# Patient Record
Sex: MALE | Race: WHITE | NOT HISPANIC OR LATINO | Employment: OTHER | URBAN - METROPOLITAN AREA
[De-identification: names, ages, dates, MRNs, and addresses within clinical notes are randomized per-mention and may not be internally consistent; named-entity substitution may affect disease eponyms.]

---

## 2023-08-07 LAB
ALANINE AMINOTRANSFERASE (SGPT) (U/L) IN SER/PLAS: 15 U/L (ref 10–52)
ALBUMIN (G/DL) IN SER/PLAS: 4.5 G/DL (ref 3.4–5)
ALKALINE PHOSPHATASE (U/L) IN SER/PLAS: 72 U/L (ref 33–136)
ANION GAP IN SER/PLAS: 12 MMOL/L (ref 10–20)
ASPARTATE AMINOTRANSFERASE (SGOT) (U/L) IN SER/PLAS: 20 U/L (ref 9–39)
BILIRUBIN TOTAL (MG/DL) IN SER/PLAS: 0.7 MG/DL (ref 0–1.2)
CALCIUM (MG/DL) IN SER/PLAS: 9.8 MG/DL (ref 8.6–10.6)
CARBON DIOXIDE, TOTAL (MMOL/L) IN SER/PLAS: 30 MMOL/L (ref 21–32)
CHLORIDE (MMOL/L) IN SER/PLAS: 104 MMOL/L (ref 98–107)
CHOLESTEROL (MG/DL) IN SER/PLAS: 149 MG/DL (ref 0–199)
CHOLESTEROL IN HDL (MG/DL) IN SER/PLAS: 33.8 MG/DL
CHOLESTEROL/HDL RATIO: 4.4
CREATININE (MG/DL) IN SER/PLAS: 0.88 MG/DL (ref 0.5–1.3)
ERYTHROCYTE DISTRIBUTION WIDTH (RATIO) BY AUTOMATED COUNT: 15 % (ref 11.5–14.5)
ERYTHROCYTE MEAN CORPUSCULAR HEMOGLOBIN CONCENTRATION (G/DL) BY AUTOMATED: 31.3 G/DL (ref 32–36)
ERYTHROCYTE MEAN CORPUSCULAR VOLUME (FL) BY AUTOMATED COUNT: 94 FL (ref 80–100)
ERYTHROCYTES (10*6/UL) IN BLOOD BY AUTOMATED COUNT: 4.64 X10E12/L (ref 4.5–5.9)
GFR MALE: >90 ML/MIN/1.73M2
GLUCOSE (MG/DL) IN SER/PLAS: 101 MG/DL (ref 74–99)
HEMATOCRIT (%) IN BLOOD BY AUTOMATED COUNT: 43.8 % (ref 41–52)
HEMOGLOBIN (G/DL) IN BLOOD: 13.7 G/DL (ref 13.5–17.5)
LDL: 91 MG/DL (ref 0–99)
LEUKOCYTES (10*3/UL) IN BLOOD BY AUTOMATED COUNT: 7 X10E9/L (ref 4.4–11.3)
NRBC (PER 100 WBCS) BY AUTOMATED COUNT: 0 /100 WBC (ref 0–0)
PLATELETS (10*3/UL) IN BLOOD AUTOMATED COUNT: 223 X10E9/L (ref 150–450)
POTASSIUM (MMOL/L) IN SER/PLAS: 5.3 MMOL/L (ref 3.5–5.3)
PROTEIN TOTAL: 7.8 G/DL (ref 6.4–8.2)
SODIUM (MMOL/L) IN SER/PLAS: 141 MMOL/L (ref 136–145)
TRIGLYCERIDE (MG/DL) IN SER/PLAS: 123 MG/DL (ref 0–149)
UREA NITROGEN (MG/DL) IN SER/PLAS: 17 MG/DL (ref 6–23)
VLDL: 25 MG/DL (ref 0–40)

## 2024-01-10 DIAGNOSIS — I48.92 UNSPECIFIED ATRIAL FLUTTER (MULTI): Primary | ICD-10-CM

## 2024-01-10 PROBLEM — M54.16 LEFT LUMBAR RADICULITIS: Status: ACTIVE | Noted: 2024-01-10

## 2024-01-10 PROBLEM — R91.1 SOLITARY PULMONARY NODULE: Status: ACTIVE | Noted: 2024-01-10

## 2024-01-10 PROBLEM — I10 HYPERTENSION: Status: ACTIVE | Noted: 2024-01-10

## 2024-01-10 PROBLEM — E78.5 HYPERLIPIDEMIA: Status: ACTIVE | Noted: 2024-01-10

## 2024-01-10 PROBLEM — M54.16 LUMBAR RADICULOPATHY: Status: ACTIVE | Noted: 2024-01-10

## 2024-01-10 PROBLEM — I83.899 VARICOSE VEINS OF LEG WITH SWELLING: Status: ACTIVE | Noted: 2024-01-10

## 2024-01-10 PROBLEM — M48.07 FORAMINAL STENOSIS OF LUMBOSACRAL REGION: Status: ACTIVE | Noted: 2024-01-10

## 2024-01-10 PROBLEM — M54.42 CHRONIC LEFT-SIDED LOW BACK PAIN WITH LEFT-SIDED SCIATICA: Status: ACTIVE | Noted: 2024-01-10

## 2024-01-10 PROBLEM — G89.29 CHRONIC LEFT-SIDED LOW BACK PAIN WITH LEFT-SIDED SCIATICA: Status: ACTIVE | Noted: 2024-01-10

## 2024-01-10 PROBLEM — I50.9 CONGESTIVE HEART FAILURE (MULTI): Status: ACTIVE | Noted: 2024-01-10

## 2024-01-10 PROBLEM — L60.0 INGROWN TOENAIL: Status: ACTIVE | Noted: 2024-01-10

## 2024-01-10 PROBLEM — M51.26 DISC DISPLACEMENT, LUMBAR: Status: ACTIVE | Noted: 2024-01-10

## 2024-01-10 PROBLEM — M51.36 DEGENERATION OF INTERVERTEBRAL DISC OF LUMBAR REGION: Status: ACTIVE | Noted: 2024-01-10

## 2024-01-10 PROBLEM — L30.9 ECZEMA: Status: ACTIVE | Noted: 2024-01-10

## 2024-01-10 PROBLEM — I42.0 DILATED CONGESTIVE CARDIOMYOPATHY (MULTI): Status: ACTIVE | Noted: 2024-01-10

## 2024-01-10 PROBLEM — M51.369 DEGENERATION OF INTERVERTEBRAL DISC OF LUMBAR REGION: Status: ACTIVE | Noted: 2024-01-10

## 2024-01-10 PROBLEM — M54.32 SCIATICA OF LEFT SIDE: Status: ACTIVE | Noted: 2024-01-10

## 2024-01-10 PROBLEM — I25.10 CORONARY ARTERIOSCLEROSIS: Status: ACTIVE | Noted: 2024-01-10

## 2024-01-10 PROBLEM — E74.39 OTHER DISORDERS OF INTESTINAL CARBOHYDRATE ABSORPTION: Status: ACTIVE | Noted: 2024-01-10

## 2024-01-10 RX ORDER — EZETIMIBE 10 MG/1
1 TABLET ORAL NIGHTLY
COMMUNITY
Start: 2021-04-30

## 2024-01-10 RX ORDER — ROSUVASTATIN CALCIUM 40 MG/1
40 TABLET, COATED ORAL DAILY
COMMUNITY

## 2024-01-10 RX ORDER — CARVEDILOL 25 MG/1
50 TABLET ORAL 2 TIMES DAILY
COMMUNITY

## 2024-01-10 RX ORDER — APIXABAN 5 MG/1
5 TABLET, FILM COATED ORAL 2 TIMES DAILY
Qty: 180 TABLET | Refills: 3 | Status: SHIPPED | OUTPATIENT
Start: 2024-01-10

## 2024-01-10 RX ORDER — APIXABAN 5 MG/1
5 TABLET, FILM COATED ORAL 2 TIMES DAILY
COMMUNITY
End: 2024-02-28 | Stop reason: SDUPTHER

## 2024-01-10 RX ORDER — SPIRONOLACTONE 25 MG/1
12.5 TABLET ORAL DAILY
COMMUNITY

## 2024-01-10 RX ORDER — LOSARTAN POTASSIUM 50 MG/1
50 TABLET ORAL DAILY
COMMUNITY

## 2024-02-28 ENCOUNTER — LAB (OUTPATIENT)
Dept: LAB | Facility: LAB | Age: 74
End: 2024-02-28
Payer: COMMERCIAL

## 2024-02-28 ENCOUNTER — OFFICE VISIT (OUTPATIENT)
Dept: CARDIOLOGY | Facility: HOSPITAL | Age: 74
End: 2024-02-28
Payer: COMMERCIAL

## 2024-02-28 VITALS
SYSTOLIC BLOOD PRESSURE: 126 MMHG | HEIGHT: 74 IN | WEIGHT: 315 LBS | DIASTOLIC BLOOD PRESSURE: 74 MMHG | BODY MASS INDEX: 40.43 KG/M2 | HEART RATE: 46 BPM

## 2024-02-28 DIAGNOSIS — Z12.5 SCREENING FOR PROSTATE CANCER: ICD-10-CM

## 2024-02-28 DIAGNOSIS — Z87.891 HISTORY OF TOBACCO USE: ICD-10-CM

## 2024-02-28 DIAGNOSIS — I48.3 TYPICAL ATRIAL FLUTTER (MULTI): ICD-10-CM

## 2024-02-28 DIAGNOSIS — I10 PRIMARY HYPERTENSION: ICD-10-CM

## 2024-02-28 DIAGNOSIS — I42.0 DILATED CONGESTIVE CARDIOMYOPATHY (MULTI): ICD-10-CM

## 2024-02-28 DIAGNOSIS — E74.39 GLUCOSE INTOLERANCE: ICD-10-CM

## 2024-02-28 DIAGNOSIS — E78.5 HYPERLIPIDEMIA, UNSPECIFIED HYPERLIPIDEMIA TYPE: ICD-10-CM

## 2024-02-28 DIAGNOSIS — I42.0 DILATED CONGESTIVE CARDIOMYOPATHY (MULTI): Primary | ICD-10-CM

## 2024-02-28 DIAGNOSIS — I25.10 CORONARY ARTERIOSCLEROSIS: ICD-10-CM

## 2024-02-28 DIAGNOSIS — R79.9 ABNORMAL FINDING OF BLOOD CHEMISTRY, UNSPECIFIED: ICD-10-CM

## 2024-02-28 LAB
ALBUMIN SERPL BCP-MCNC: 4.4 G/DL (ref 3.4–5)
ALP SERPL-CCNC: 66 U/L (ref 33–136)
ALT SERPL W P-5'-P-CCNC: 18 U/L (ref 10–52)
ANION GAP SERPL CALC-SCNC: 13 MMOL/L (ref 10–20)
AST SERPL W P-5'-P-CCNC: 23 U/L (ref 9–39)
BILIRUB SERPL-MCNC: 0.5 MG/DL (ref 0–1.2)
BUN SERPL-MCNC: 24 MG/DL (ref 6–23)
CALCIUM SERPL-MCNC: 9.1 MG/DL (ref 8.6–10.3)
CHLORIDE SERPL-SCNC: 103 MMOL/L (ref 98–107)
CHOLEST SERPL-MCNC: 155 MG/DL (ref 0–199)
CHOLESTEROL/HDL RATIO: 4
CO2 SERPL-SCNC: 27 MMOL/L (ref 21–32)
CREAT SERPL-MCNC: 0.92 MG/DL (ref 0.5–1.3)
EGFRCR SERPLBLD CKD-EPI 2021: 88 ML/MIN/1.73M*2
ERYTHROCYTE [DISTWIDTH] IN BLOOD BY AUTOMATED COUNT: 14.7 % (ref 11.5–14.5)
EST. AVERAGE GLUCOSE BLD GHB EST-MCNC: 128 MG/DL
GLUCOSE SERPL-MCNC: 101 MG/DL (ref 74–99)
HBA1C MFR BLD: 6.1 %
HCT VFR BLD AUTO: 42.8 % (ref 41–52)
HDLC SERPL-MCNC: 38.5 MG/DL
HGB BLD-MCNC: 13.7 G/DL (ref 13.5–17.5)
LDLC SERPL CALC-MCNC: 94 MG/DL
MCH RBC QN AUTO: 30 PG (ref 26–34)
MCHC RBC AUTO-ENTMCNC: 32 G/DL (ref 32–36)
MCV RBC AUTO: 94 FL (ref 80–100)
NON HDL CHOLESTEROL: 117 MG/DL (ref 0–149)
NRBC BLD-RTO: 0 /100 WBCS (ref 0–0)
PLATELET # BLD AUTO: 196 X10*3/UL (ref 150–450)
POTASSIUM SERPL-SCNC: 4.8 MMOL/L (ref 3.5–5.3)
PROT SERPL-MCNC: 7.8 G/DL (ref 6.4–8.2)
PSA SERPL-MCNC: 1.82 NG/ML
RBC # BLD AUTO: 4.57 X10*6/UL (ref 4.5–5.9)
SODIUM SERPL-SCNC: 138 MMOL/L (ref 136–145)
TRIGL SERPL-MCNC: 112 MG/DL (ref 0–149)
VLDL: 22 MG/DL (ref 0–40)
WBC # BLD AUTO: 6.2 X10*3/UL (ref 4.4–11.3)

## 2024-02-28 PROCEDURE — G0103 PSA SCREENING: HCPCS

## 2024-02-28 PROCEDURE — 1036F TOBACCO NON-USER: CPT | Performed by: INTERNAL MEDICINE

## 2024-02-28 PROCEDURE — 80061 LIPID PANEL: CPT

## 2024-02-28 PROCEDURE — 93005 ELECTROCARDIOGRAM TRACING: CPT | Performed by: INTERNAL MEDICINE

## 2024-02-28 PROCEDURE — 99214 OFFICE O/P EST MOD 30 MIN: CPT | Performed by: INTERNAL MEDICINE

## 2024-02-28 PROCEDURE — 36415 COLL VENOUS BLD VENIPUNCTURE: CPT

## 2024-02-28 PROCEDURE — 3074F SYST BP LT 130 MM HG: CPT | Performed by: INTERNAL MEDICINE

## 2024-02-28 PROCEDURE — 83036 HEMOGLOBIN GLYCOSYLATED A1C: CPT

## 2024-02-28 PROCEDURE — 85027 COMPLETE CBC AUTOMATED: CPT

## 2024-02-28 PROCEDURE — 93010 ELECTROCARDIOGRAM REPORT: CPT | Performed by: INTERNAL MEDICINE

## 2024-02-28 PROCEDURE — 80053 COMPREHEN METABOLIC PANEL: CPT

## 2024-02-28 PROCEDURE — 1159F MED LIST DOCD IN RCRD: CPT | Performed by: INTERNAL MEDICINE

## 2024-02-28 PROCEDURE — 3078F DIAST BP <80 MM HG: CPT | Performed by: INTERNAL MEDICINE

## 2024-02-28 PROCEDURE — 1125F AMNT PAIN NOTED PAIN PRSNT: CPT | Performed by: INTERNAL MEDICINE

## 2024-02-28 PROCEDURE — 1160F RVW MEDS BY RX/DR IN RCRD: CPT | Performed by: INTERNAL MEDICINE

## 2024-02-28 ASSESSMENT — ENCOUNTER SYMPTOMS
DEPRESSION: 0
OCCASIONAL FEELINGS OF UNSTEADINESS: 0
LOSS OF SENSATION IN FEET: 0

## 2024-02-28 NOTE — PROGRESS NOTES
Primary Care Physician: Westley Cross MD  Date of Visit: 02/28/2024 11:20 AM EST  Location of visit: [facility]     Chief Complaint:   Chief Complaint   Patient presents with    Follow-up        HPI / Summary:   Jese Christy is a 73 y.o. male presents for followup.  He has no particular complaints.  Most limited by his chronic back pain.  He is able to walk up and down stairs without chest pain or shortness of breath.  The patient denies chest pain, shortness of breath, palpitations, lightheadedness, syncope, orthopnea, paroxysmal nocturnal dyspnea, lower extremity edema, or bleeding problems.          Past Medical History:   Diagnosis Date    Dilated cardiomyopathy (CMS/HCC) 05/11/2022    Dilated congestive cardiomyopathy    Personal history of other diseases of the circulatory system     History of atrial fibrillation    Personal history of other diseases of the circulatory system     History of congestive heart disease    Personal history of other diseases of the circulatory system     History of hypertension    Personal history of other endocrine, nutritional and metabolic disease     History of obesity    Personal history of other endocrine, nutritional and metabolic disease     History of hyperlipidemia    Solitary pulmonary nodule 11/06/2013    Solitary pulmonary nodule    Unspecified atrial flutter (CMS/HCC)     Atrial flutter        Past Surgical History:   Procedure Laterality Date    AV NODE ABLATION  03/04/2014    Catheter Ablation Atrial Fibrillation    BACK SURGERY  12/12/2013    Back Surgery          Social History:   reports that he has quit smoking. His smoking use included cigarettes. He has a 20.00 pack-year smoking history. He has never used smokeless tobacco. He reports that he does not currently use alcohol. He reports that he does not currently use drugs.     Family History:  family history is not on file.      Allergies:  Allergies   Allergen Reactions    Lisinopril Cough     "Penicillins Hives and Itching       Outpatient Medications:  Current Outpatient Medications   Medication Instructions    carvedilol (COREG) 50 mg, oral, 2 times daily    Eliquis 5 mg, oral, 2 times daily    ezetimibe (Zetia) 10 mg tablet 1 tablet, oral, Nightly    losartan (COZAAR) 50 mg, oral, Daily    rosuvastatin (CRESTOR) 40 mg, oral, Daily    spironolactone (ALDACTONE) 12.5 mg, oral, Daily       Physical Exam:  Vitals:    02/28/24 1110   BP: 126/74   BP Location: Left arm   Patient Position: Sitting   BP Cuff Size: Adult   Pulse: (!) 46   Weight: 147 kg (323 lb 14.4 oz)   Height: 1.88 m (6' 2\")     Wt Readings from Last 5 Encounters:   02/28/24 147 kg (323 lb 14.4 oz)   08/03/23 149 kg (329 lb)   05/11/22 (!) 152 kg (335 lb 0.8 oz)   01/27/22 150 kg (330 lb 11 oz)   09/09/21 (!) 147 kg (325 lb)     Body mass index is 41.59 kg/m².   General: Well-developed well-nourished in no acute distress  HEENT: Normocephalic atraumatic  Neck: Supple, JVP is normal negative hepatojugular reflux 2+ carotid pulses without bruit  Pulmonary: Normal respiratory effort, clear to auscultation  Cardiovascular: No right ventricular heave, normal S1 and S2, 2 out of 6 early peaking systolic ejection murmur no rubs or gallops  Abdomen: Soft nontender nondistended  Extremities: Warm without edema 2+ radial pulses bilaterally 2+ posterior tibial pulses bilaterally  Neurologic: Alert and oriented x3  Psychiatric: Normal mood and affect     Last Labs:  CMP:  Recent Labs     08/07/23  1240 05/11/22  0935 01/05/22  1040    138 141   K 5.3 4.6 4.6    102 104   CO2 30 29 31   ANIONGAP 12 12 11   BUN 17 23 19   CREATININE 0.88 0.75 1.03   GLUCOSE 101* 108* 102*     Recent Labs     08/07/23  1240 05/11/22  0935 12/11/19  1122   ALBUMIN 4.5 4.0 4.5   ALKPHOS 72 71 70   ALT 15 15 15   AST 20 17 18   BILITOT 0.7 0.5 0.7     CBC:  Recent Labs     08/07/23  1240 05/11/22  0935 01/05/22  1040   WBC 7.0 6.9 6.9   HGB 13.7 13.4* 13.4*   HCT " "43.8 43.2 42.2    193 210   MCV 94 95 94     COAG:   Recent Labs     01/05/22  1040   INR 1.4*     HEME/ENDO:No results for input(s): \"FERRITIN\", \"IRONSAT\", \"TSH\", \"HGBA1C\" in the last 65450 hours.   CARDIAC: No results for input(s): \"LDH\", \"CKMB\", \"TROPHS\", \"BNP\" in the last 45338 hours.    No lab exists for component: \"CK\", \"CKMBP\"  Recent Labs     08/07/23  1240 05/11/22  0935 02/22/21  1128   CHOL 149 115 160   LDLF 91 62 104*   HDL 33.8* 37.4* 35.9*   TRIG 123 79 103       Last Cardiology Tests:  ECG:  Electrocardiogram performed today that I reviewed shows sinus bradycardia and is otherwise unremarkable.    Echo:  Echocardiogram May 11, 2022  CONCLUSIONS:   1. The left ventricular systolic function is normal.   2. Spectral Doppler shows an impaired relaxation pattern of left ventricular diastolic filling.   3. The left atrium is moderately dilated.   4. There is moderate aortic valve cusp calcification.       Cath:      Stress Test:  Stress Results:  No results found for this or any previous visit from the past 365 days.         Cardiac Imaging:        Assessment/Plan   Diagnoses and all orders for this visit:  Dilated congestive cardiomyopathy (CMS/HCC)  -     Comprehensive Metabolic Panel; Future  -     CBC; Future  Hyperlipidemia, unspecified hyperlipidemia type  -     ECG 12 lead (Clinic Performed)  -     Lipid Panel; Future  Primary hypertension  -     PSA; Future  Coronary arteriosclerosis  Typical atrial flutter (CMS/HCC)  Glucose intolerance  -     Hemoglobin A1C; Future  Abnormal finding of blood chemistry, unspecified  -     Hemoglobin A1C; Future  Screening for prostate cancer  -     PSA; Future  History of tobacco use  -     Vascular US abdominal aorta anuerysm AAA screening; Future    In summary, Mr. 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  He is asymptomatic from a cardiac perspective.  He is euvolemic on exam.  I encouraged him to increase his physical activity and to continue to work on losing weight.  He will follow-up with his primary physician regarding the possibility of a GLP-1 agonist.  I ordered labs as indicated below.  He should continue his current cardiovascular medications.  We will see him back in follow-up in 6 months with an abdominal aortic ultrasound to screen for an aneurysm given his prior tobacco use.      Orders:  Orders Placed This Encounter   Procedures    Lipid Panel    Comprehensive Metabolic Panel    CBC    Hemoglobin A1C    PSA    ECG 12 lead (Clinic Performed)      Followup Appts:  Future Appointments   Date Time Provider Department Center   9/4/2024 10:00 AM [facility] 1 [facility]   9/4/2024 11:00 AM PATEL Stock-CNP [facility]CR1 [facility]   2/26/2025  2:00 PM Reynold Chakraborty MD [facility]CR1 [facility]           ____________________________________________________________  Reynold Chakraborty MD  [facility] [place]  [facility]

## 2024-02-28 NOTE — PATIENT INSTRUCTIONS
Check labs today.  Increase your physical activity.  Follow-up in 6 months with an abdominal aortic ultrasound.

## 2024-03-01 LAB
ATRIAL RATE: 46 BPM
P AXIS: 35 DEGREES
P OFFSET: 196 MS
P ONSET: 142 MS
PR INTERVAL: 154 MS
Q ONSET: 219 MS
QRS COUNT: 7 BEATS
QRS DURATION: 106 MS
QT INTERVAL: 454 MS
QTC CALCULATION(BAZETT): 397 MS
QTC FREDERICIA: 415 MS
R AXIS: 53 DEGREES
T AXIS: 67 DEGREES
T OFFSET: 446 MS
VENTRICULAR RATE: 46 BPM

## 2024-03-17 NOTE — RESULT ENCOUNTER NOTE
Stable CBC. Mildly elevated A1C and glucose. F/u with PCP. Lipids not at goal <70 LDL. Add PCSK9 inhibitor. Recheck lipids in 3 months.

## 2024-04-12 ENCOUNTER — TELEPHONE (OUTPATIENT)
Dept: CARDIOLOGY | Facility: HOSPITAL | Age: 74
End: 2024-04-12
Payer: COMMERCIAL

## 2024-04-12 DIAGNOSIS — E78.5 HYPERLIPIDEMIA, UNSPECIFIED HYPERLIPIDEMIA TYPE: Primary | ICD-10-CM

## 2024-04-12 NOTE — TELEPHONE ENCOUNTER
Patient was on another call. Lab results provided. He will call us at a later time to discuss cholesterol management.

## 2024-04-26 NOTE — TELEPHONE ENCOUNTER
Per Dr. Chakraborty,   Stable CBC. Mildly elevated A1C and glucose. F/u with PCP. Lipids not at goal <70 LDL. Add PCSK9 inhibitor. Recheck lipids in 3 months.     Patient notified and agreeable to plan of care.

## 2024-04-29 RX ORDER — EVOLOCUMAB 140 MG/ML
140 INJECTION, SOLUTION SUBCUTANEOUS
Qty: 6 EACH | Refills: 4 | Status: SHIPPED | OUTPATIENT
Start: 2024-04-29

## 2024-05-03 PROCEDURE — RXMED WILLOW AMBULATORY MEDICATION CHARGE

## 2024-05-10 ENCOUNTER — PHARMACY VISIT (OUTPATIENT)
Dept: PHARMACY | Facility: CLINIC | Age: 74
End: 2024-05-10
Payer: COMMERCIAL

## 2024-07-28 DIAGNOSIS — I50.9 HEART FAILURE, UNSPECIFIED (MULTI): ICD-10-CM

## 2024-07-28 DIAGNOSIS — E78.5 HYPERLIPIDEMIA, UNSPECIFIED: ICD-10-CM

## 2024-07-28 DIAGNOSIS — I10 ESSENTIAL (PRIMARY) HYPERTENSION: ICD-10-CM

## 2024-07-29 RX ORDER — LOSARTAN POTASSIUM 50 MG/1
50 TABLET ORAL DAILY
Qty: 90 TABLET | Refills: 3 | Status: SHIPPED | OUTPATIENT
Start: 2024-07-29 | End: 2025-07-29

## 2024-07-29 RX ORDER — SPIRONOLACTONE 25 MG/1
12.5 TABLET ORAL DAILY
Qty: 45 TABLET | Refills: 3 | Status: SHIPPED | OUTPATIENT
Start: 2024-07-29 | End: 2025-07-29

## 2024-07-29 RX ORDER — CARVEDILOL 25 MG/1
50 TABLET ORAL 2 TIMES DAILY
Qty: 360 TABLET | Refills: 3 | Status: SHIPPED | OUTPATIENT
Start: 2024-07-29 | End: 2025-07-29

## 2024-07-29 RX ORDER — ROSUVASTATIN CALCIUM 40 MG/1
40 TABLET, COATED ORAL DAILY
Qty: 90 TABLET | Refills: 3 | Status: SHIPPED | OUTPATIENT
Start: 2024-07-29 | End: 2025-07-29

## 2024-08-28 ENCOUNTER — APPOINTMENT (OUTPATIENT)
Dept: CARDIOLOGY | Facility: HOSPITAL | Age: 74
End: 2024-08-28
Payer: COMMERCIAL

## 2024-09-04 ENCOUNTER — OFFICE VISIT (OUTPATIENT)
Dept: CARDIOLOGY | Facility: HOSPITAL | Age: 74
End: 2024-09-04
Payer: COMMERCIAL

## 2024-09-04 ENCOUNTER — HOSPITAL ENCOUNTER (OUTPATIENT)
Dept: VASCULAR MEDICINE | Facility: HOSPITAL | Age: 74
Discharge: HOME | End: 2024-09-04
Payer: COMMERCIAL

## 2024-09-04 VITALS
HEIGHT: 74 IN | HEART RATE: 55 BPM | SYSTOLIC BLOOD PRESSURE: 127 MMHG | WEIGHT: 315 LBS | OXYGEN SATURATION: 97 % | DIASTOLIC BLOOD PRESSURE: 74 MMHG | BODY MASS INDEX: 40.43 KG/M2

## 2024-09-04 DIAGNOSIS — I10 PRIMARY HYPERTENSION: ICD-10-CM

## 2024-09-04 DIAGNOSIS — Z13.6 ENCOUNTER FOR SCREENING FOR CARDIOVASCULAR DISORDERS: ICD-10-CM

## 2024-09-04 DIAGNOSIS — I25.10 CORONARY ARTERIOSCLEROSIS: ICD-10-CM

## 2024-09-04 DIAGNOSIS — I48.3 TYPICAL ATRIAL FLUTTER (MULTI): ICD-10-CM

## 2024-09-04 DIAGNOSIS — E78.5 HYPERLIPIDEMIA, UNSPECIFIED HYPERLIPIDEMIA TYPE: Primary | ICD-10-CM

## 2024-09-04 DIAGNOSIS — I42.0 DILATED CONGESTIVE CARDIOMYOPATHY (MULTI): ICD-10-CM

## 2024-09-04 DIAGNOSIS — Z87.891 HISTORY OF TOBACCO USE: ICD-10-CM

## 2024-09-04 LAB
ATRIAL RATE: 55 BPM
P AXIS: 51 DEGREES
P OFFSET: 199 MS
P ONSET: 132 MS
PR INTERVAL: 162 MS
Q ONSET: 213 MS
QRS COUNT: 9 BEATS
QRS DURATION: 106 MS
QT INTERVAL: 432 MS
QTC CALCULATION(BAZETT): 413 MS
QTC FREDERICIA: 419 MS
R AXIS: 40 DEGREES
T AXIS: 62 DEGREES
T OFFSET: 429 MS
VENTRICULAR RATE: 55 BPM

## 2024-09-04 PROCEDURE — 99214 OFFICE O/P EST MOD 30 MIN: CPT | Performed by: NURSE PRACTITIONER

## 2024-09-04 PROCEDURE — 93005 ELECTROCARDIOGRAM TRACING: CPT | Performed by: NURSE PRACTITIONER

## 2024-09-04 PROCEDURE — 76706 US ABDL AORTA SCREEN AAA: CPT | Performed by: SURGERY

## 2024-09-04 PROCEDURE — 1160F RVW MEDS BY RX/DR IN RCRD: CPT | Performed by: NURSE PRACTITIONER

## 2024-09-04 PROCEDURE — 3008F BODY MASS INDEX DOCD: CPT | Performed by: NURSE PRACTITIONER

## 2024-09-04 PROCEDURE — G2211 COMPLEX E/M VISIT ADD ON: HCPCS | Performed by: NURSE PRACTITIONER

## 2024-09-04 PROCEDURE — 76706 US ABDL AORTA SCREEN AAA: CPT

## 2024-09-04 PROCEDURE — 1159F MED LIST DOCD IN RCRD: CPT | Performed by: NURSE PRACTITIONER

## 2024-09-04 PROCEDURE — 3074F SYST BP LT 130 MM HG: CPT | Performed by: NURSE PRACTITIONER

## 2024-09-04 PROCEDURE — 1036F TOBACCO NON-USER: CPT | Performed by: NURSE PRACTITIONER

## 2024-09-04 PROCEDURE — 3078F DIAST BP <80 MM HG: CPT | Performed by: NURSE PRACTITIONER

## 2024-09-04 ASSESSMENT — ENCOUNTER SYMPTOMS
LOSS OF SENSATION IN FEET: 0
DEPRESSION: 0
OCCASIONAL FEELINGS OF UNSTEADINESS: 0

## 2024-09-04 NOTE — PATIENT INSTRUCTIONS
Continue current cardiovascular medications  Check blood work CBC, CMP, fasting lipid panel, TSH  Follow up in 6 months  Follow up with primary care physician to discuss GLP 1 inhibitor for weight loss

## 2024-09-04 NOTE — PROGRESS NOTES
[facility] Physician: Westley Cross MD  Date of Visit: 09/04/2024 11:00 AM EDT  Location of visit: [facility]     Chief Complaint:   No chief complaint on file.       HPI / Summary:   Jese Christy is a 74 y.o. male presents for followup. Seen in collaboration with Dr. Chakraborty. His activity is most limited by chronic back pain. He is able to walk up 13 steps without chest pain or dyspnea. He is able to weed whack without symptoms. Denies chest pain, dyspnea, orthopnea, pnd, lightheadedness, dizziness, syncope, palpitations, lower extremity edema, or bleeding issues.                Past Medical History:  Past Medical History:   Diagnosis Date    Dilated cardiomyopathy (Multi) 05/11/2022    Dilated congestive cardiomyopathy    Personal history of other diseases of the circulatory system     History of atrial fibrillation    Personal history of other diseases of the circulatory system     History of congestive heart disease    Personal history of other diseases of the circulatory system     History of hypertension    Personal history of other endocrine, nutritional and metabolic disease     History of obesity    Personal history of other endocrine, nutritional and metabolic disease     History of hyperlipidemia    Solitary pulmonary nodule 11/06/2013    Solitary pulmonary nodule    Unspecified atrial flutter (Multi)     Atrial flutter        Past Surgical History:  Past Surgical History:   Procedure Laterality Date    AV NODE ABLATION  03/04/2014    Catheter Ablation Atrial Fibrillation    BACK SURGERY  12/12/2013    Back Surgery          Social History:   reports that he has quit smoking. His smoking use included cigarettes. He has a 20 pack-year smoking history. He has never used smokeless tobacco. He reports that he does not currently use alcohol. He reports that he does not currently use drugs.     Family History:  family history is not on file.      Allergies:  Allergies   Allergen Reactions     "Lisinopril Cough    Penicillins Hives and Itching       Outpatient Medications:  Current Outpatient Medications   Medication Instructions    carvedilol (COREG) 50 mg, oral, 2 times daily    Eliquis 5 mg, oral, 2 times daily    ezetimibe (Zetia) 10 mg tablet 1 tablet, oral, Nightly    losartan (COZAAR) 50 mg, oral, Daily    Repatha SureClick 140 mg, subcutaneous, Every 14 days    rosuvastatin (CRESTOR) 40 mg, oral, Daily    spironolactone (ALDACTONE) 12.5 mg, oral, Daily       Physical Exam:  Vitals:    09/04/24 1059   BP: 127/74   BP Location: Left arm   Pulse: 55   SpO2: 97%   Weight: 148 kg (327 lb 3.2 oz)   Height: 1.88 m (6' 2\")     Wt Readings from Last 5 Encounters:   09/04/24 148 kg (327 lb 3.2 oz)   02/28/24 147 kg (323 lb 14.4 oz)   08/03/23 149 kg (329 lb)   05/11/22 (!) 152 kg (335 lb 0.8 oz)   01/27/22 150 kg (330 lb 11 oz)     Body mass index is 42.01 kg/m².   GENERAL: alert, cooperative, pleasant, in no acute distress  SKIN: warm and dry  NECK: Normal JVD, negative HJR  CARDIAC: Regular rate and rhythm with 2/6 early peaking systolic murmur no rubs or gallops  CHEST: Normal respiratory efforts, lungs clear to auscultation bilaterally.  ABDOMEN: soft, nontender, nondistended  EXTREMITIES: no edema, +2 palpable RP and PT pulses bilaterally       Last Labs:  Recent Labs     02/28/24  1215 08/07/23  1240 05/11/22  0935   WBC 6.2 7.0 6.9   HGB 13.7 13.7 13.4*   HCT 42.8 43.8 43.2    223 193   MCV 94 94 95     Recent Labs     02/28/24  1215 08/07/23  1240 05/11/22  0935    141 138   K 4.8 5.3 4.6    104 102   BUN 24* 17 23   CREATININE 0.92 0.88 0.75     CMP -  Lab Results   Component Value Date    CALCIUM 9.1 02/28/2024    PROT 7.8 02/28/2024    ALBUMIN 4.4 02/28/2024    AST 23 02/28/2024    ALT 18 02/28/2024    ALKPHOS 66 02/28/2024    BILITOT 0.5 02/28/2024       LIPID PANEL -   Lab Results   Component Value Date    CHOL 155 02/28/2024    HDL 38.5 02/28/2024    LDLF 91 08/07/2023    " TRIG 112 02/28/2024   LDL 94 2/28/24    Lab Results   Component Value Date    HGBA1C 6.1 (H) 02/28/2024       Last Cardiology Tests:  ECG:  Obtained and reviewed EKG- Sinus bradycardia HR 55    Echo:  Echocardiogram May 11, 2022  CONCLUSIONS:   1. The left ventricular systolic function is normal.   2. Spectral Doppler shows an impaired relaxation pattern of left ventricular diastolic filling.   3. The left atrium is moderately dilated.   4. There is moderate aortic valve cusp calcification.        Cath:        Stress Test:  Stress Results:  No results found for this or any previous visit from the past 365 days.           Cardiac Imaging:            Cardiac Imaging:  Vascular US abdominal aorta anuerysm AAA screening               [place] [address]    Tel [phone] and Fax [phone]       Vascular Lab Report  [facility] US ABDOMINAL AORTA ANEURYSM AAA SCREENING       Patient Name:      CAMILO UREÑA  Reading Physician:  [id number] Reynold Flowers MD  Study Date:        9/4/2024            Ordering Physician: [id number] REYNOLD CASTRO  MRN/PID:           [id number]            Technologist:       Fatoumata Merino DUSTY  Accession#:        [id number]        Technologist 2:  Date of Birth/Age: 1950 / 74 years Encounter#:         [id number]  Gender:            M  Admission Status:  Outpatient          Location Performed: [facility]       Diagnosis/ICD: Encounter for screening for cardiovascular disorders (AAA)-Z13.6  CPT Codes:     02685 Ultrasound, abdominal aorta, real time with image                 documentation, screening study for (AAA)       CONCLUSIONS:  Aorta/Common Iliac Arteries/IVC: No evidence of abdominal aortic aneurysm in this screening examination. Technically difficult study due to patient body habitus.     Imaging & Doppler Findings:     AORTA    AP    Lateral    PSV  Distal 1.70 cm 1.70 cm 76.0 cm/s       [id number] Reynold Flowers MD  Electronically signed by  [id number] Reynold Flowers MD on 9/4/2024 at 11:09:49 AM       ** Final **          Assessment/Plan   Problem List Items Addressed This Visit          Cardiac and Vasculature    Atrial flutter (Multi)    Relevant Orders    ECG 12 lead (Clinic Performed)    CBC    Comprehensive metabolic panel    TSH with reflex to Free T4 if abnormal    Coronary arteriosclerosis    Relevant Orders    CBC    Lipid panel    Dilated congestive cardiomyopathy (Multi)    Relevant Orders    Comprehensive metabolic panel    Hyperlipidemia - Primary    Relevant Orders    TSH with reflex to Free T4 if abnormal    Lipid panel    Hypertension    Relevant Orders    Comprehensive metabolic panel     In summary, Mr. Christy is a pleasant, 74 year-old white male past medical history significant for paroxysmal atrial flutter status post ablation, nonischemic likely tachycardia induced dilated cardiomyopathy with recovery of his ejection fraction, hypertension, hyperlipidemia, glucose intolerance, and probable sleep apnea and nonobstructive coronary artery disease.  He is asymptomatic from a cardiac perspective.  He is euvolemic on exam.  I encouraged him to increase his physical activity and to continue to work on losing weight.  He will follow-up with his primary physician regarding the possibility of a GLP-1 agonist.  I ordered labs as above.  He should continue his current cardiovascular medications. We will see him back in follow-up in 6 months.    Orders:  No orders of the defined types were placed in this encounter.     Followup Appts:  Future Appointments   Date Time Provider Department Center   2/26/2025  2:00 PM Reynold Chakraborty MD [facility]1 [facility]           ____________________________________________________________  Diamante Mueller, APRN-CNP  [facility] [place]  [facility]

## 2024-09-11 ENCOUNTER — LAB (OUTPATIENT)
Dept: LAB | Facility: LAB | Age: 74
End: 2024-09-11
Payer: COMMERCIAL

## 2024-09-11 DIAGNOSIS — I48.3 TYPICAL ATRIAL FLUTTER (MULTI): ICD-10-CM

## 2024-09-11 DIAGNOSIS — E78.5 HYPERLIPIDEMIA, UNSPECIFIED HYPERLIPIDEMIA TYPE: ICD-10-CM

## 2024-09-11 DIAGNOSIS — I42.0 DILATED CONGESTIVE CARDIOMYOPATHY (MULTI): ICD-10-CM

## 2024-09-11 DIAGNOSIS — I25.10 CORONARY ARTERIOSCLEROSIS: ICD-10-CM

## 2024-09-11 DIAGNOSIS — I10 PRIMARY HYPERTENSION: ICD-10-CM

## 2024-09-11 LAB
ALBUMIN SERPL BCP-MCNC: 4.4 G/DL (ref 3.4–5)
ALP SERPL-CCNC: 67 U/L (ref 33–136)
ALT SERPL W P-5'-P-CCNC: 14 U/L (ref 10–52)
ANION GAP SERPL CALC-SCNC: 13 MMOL/L (ref 10–20)
AST SERPL W P-5'-P-CCNC: 23 U/L (ref 9–39)
BILIRUB SERPL-MCNC: 0.7 MG/DL (ref 0–1.2)
BUN SERPL-MCNC: 20 MG/DL (ref 6–23)
CALCIUM SERPL-MCNC: 9.6 MG/DL (ref 8.6–10.6)
CHLORIDE SERPL-SCNC: 104 MMOL/L (ref 98–107)
CHOLEST SERPL-MCNC: 101 MG/DL (ref 0–199)
CHOLESTEROL/HDL RATIO: 2.8
CO2 SERPL-SCNC: 29 MMOL/L (ref 21–32)
CREAT SERPL-MCNC: 0.91 MG/DL (ref 0.5–1.3)
EGFRCR SERPLBLD CKD-EPI 2021: 89 ML/MIN/1.73M*2
ERYTHROCYTE [DISTWIDTH] IN BLOOD BY AUTOMATED COUNT: 15.2 % (ref 11.5–14.5)
GLUCOSE SERPL-MCNC: 109 MG/DL (ref 74–99)
HCT VFR BLD AUTO: 43.1 % (ref 41–52)
HDLC SERPL-MCNC: 36.3 MG/DL
HGB BLD-MCNC: 13.7 G/DL (ref 13.5–17.5)
LDLC SERPL CALC-MCNC: 46 MG/DL
MCH RBC QN AUTO: 29.4 PG (ref 26–34)
MCHC RBC AUTO-ENTMCNC: 31.8 G/DL (ref 32–36)
MCV RBC AUTO: 93 FL (ref 80–100)
NON HDL CHOLESTEROL: 65 MG/DL (ref 0–149)
NRBC BLD-RTO: 0 /100 WBCS (ref 0–0)
PLATELET # BLD AUTO: 220 X10*3/UL (ref 150–450)
POTASSIUM SERPL-SCNC: 4.8 MMOL/L (ref 3.5–5.3)
PROT SERPL-MCNC: 7.6 G/DL (ref 6.4–8.2)
RBC # BLD AUTO: 4.66 X10*6/UL (ref 4.5–5.9)
SODIUM SERPL-SCNC: 141 MMOL/L (ref 136–145)
TRIGL SERPL-MCNC: 95 MG/DL (ref 0–149)
TSH SERPL-ACNC: 2.05 MIU/L (ref 0.44–3.98)
VLDL: 19 MG/DL (ref 0–40)
WBC # BLD AUTO: 7.1 X10*3/UL (ref 4.4–11.3)

## 2024-09-11 PROCEDURE — 85027 COMPLETE CBC AUTOMATED: CPT

## 2024-09-11 PROCEDURE — 80053 COMPREHEN METABOLIC PANEL: CPT

## 2024-09-11 PROCEDURE — 84443 ASSAY THYROID STIM HORMONE: CPT

## 2024-09-11 PROCEDURE — 36415 COLL VENOUS BLD VENIPUNCTURE: CPT

## 2024-09-11 PROCEDURE — 80061 LIPID PANEL: CPT

## 2024-10-17 PROCEDURE — RXMED WILLOW AMBULATORY MEDICATION CHARGE

## 2024-10-21 ENCOUNTER — PHARMACY VISIT (OUTPATIENT)
Dept: PHARMACY | Facility: CLINIC | Age: 74
End: 2024-10-21
Payer: COMMERCIAL

## 2025-01-31 DIAGNOSIS — I48.92 UNSPECIFIED ATRIAL FLUTTER (MULTI): Primary | ICD-10-CM

## 2025-02-05 PROCEDURE — RXMED WILLOW AMBULATORY MEDICATION CHARGE

## 2025-02-06 ENCOUNTER — PHARMACY VISIT (OUTPATIENT)
Dept: PHARMACY | Facility: CLINIC | Age: 75
End: 2025-02-06
Payer: COMMERCIAL

## 2025-02-26 ENCOUNTER — OFFICE VISIT (OUTPATIENT)
Dept: CARDIOLOGY | Facility: HOSPITAL | Age: 75
End: 2025-02-26
Payer: COMMERCIAL

## 2025-02-26 VITALS
DIASTOLIC BLOOD PRESSURE: 70 MMHG | SYSTOLIC BLOOD PRESSURE: 126 MMHG | HEART RATE: 52 BPM | WEIGHT: 315 LBS | OXYGEN SATURATION: 91 % | RESPIRATION RATE: 22 BRPM | BODY MASS INDEX: 40.43 KG/M2 | HEIGHT: 74 IN

## 2025-02-26 DIAGNOSIS — E78.5 HYPERLIPIDEMIA, UNSPECIFIED HYPERLIPIDEMIA TYPE: ICD-10-CM

## 2025-02-26 DIAGNOSIS — R79.9 ABNORMAL FINDING OF BLOOD CHEMISTRY, UNSPECIFIED: ICD-10-CM

## 2025-02-26 DIAGNOSIS — I25.10 CORONARY ARTERIOSCLEROSIS: Primary | ICD-10-CM

## 2025-02-26 DIAGNOSIS — I10 PRIMARY HYPERTENSION: ICD-10-CM

## 2025-02-26 DIAGNOSIS — I42.0 DILATED CONGESTIVE CARDIOMYOPATHY (MULTI): ICD-10-CM

## 2025-02-26 DIAGNOSIS — I48.92 ATRIAL FLUTTER, UNSPECIFIED TYPE (MULTI): ICD-10-CM

## 2025-02-26 DIAGNOSIS — E66.01 MORBID OBESITY (MULTI): ICD-10-CM

## 2025-02-26 DIAGNOSIS — E74.39 GLUCOSE INTOLERANCE: ICD-10-CM

## 2025-02-26 LAB
ATRIAL RATE: 52 BPM
P AXIS: 43 DEGREES
P OFFSET: 200 MS
P ONSET: 144 MS
PR INTERVAL: 150 MS
Q ONSET: 219 MS
QRS COUNT: 9 BEATS
QRS DURATION: 106 MS
QT INTERVAL: 438 MS
QTC CALCULATION(BAZETT): 407 MS
QTC FREDERICIA: 417 MS
R AXIS: 56 DEGREES
T AXIS: 68 DEGREES
T OFFSET: 438 MS
VENTRICULAR RATE: 52 BPM

## 2025-02-26 PROCEDURE — 93005 ELECTROCARDIOGRAM TRACING: CPT | Performed by: INTERNAL MEDICINE

## 2025-02-26 PROCEDURE — 3078F DIAST BP <80 MM HG: CPT | Performed by: INTERNAL MEDICINE

## 2025-02-26 PROCEDURE — 1159F MED LIST DOCD IN RCRD: CPT | Performed by: INTERNAL MEDICINE

## 2025-02-26 PROCEDURE — 1036F TOBACCO NON-USER: CPT | Performed by: INTERNAL MEDICINE

## 2025-02-26 PROCEDURE — G2211 COMPLEX E/M VISIT ADD ON: HCPCS | Performed by: INTERNAL MEDICINE

## 2025-02-26 PROCEDURE — 3074F SYST BP LT 130 MM HG: CPT | Performed by: INTERNAL MEDICINE

## 2025-02-26 PROCEDURE — 99214 OFFICE O/P EST MOD 30 MIN: CPT | Performed by: INTERNAL MEDICINE

## 2025-02-26 PROCEDURE — 1160F RVW MEDS BY RX/DR IN RCRD: CPT | Performed by: INTERNAL MEDICINE

## 2025-02-26 PROCEDURE — 3008F BODY MASS INDEX DOCD: CPT | Performed by: INTERNAL MEDICINE

## 2025-02-26 NOTE — PROGRESS NOTES
Primary Care Physician: Westley Cross MD  Date of Visit: 02/26/2025  2:00 PM EST  Location of visit: [facility]     Chief Complaint:   Chief Complaint   Patient presents with    Follow-up        HPI / Summary:   Jese Christy is a 74 y.o. male presents for followup.  He has no cardiac complaints.  He is able to walk up and down stairs without chest pain or shortness of breath.  The patient denies chest pain, shortness of breath, palpitations, lightheadedness, syncope, orthopnea, paroxysmal nocturnal dyspnea, lower extremity edema, or bleeding problems.          Past Medical History:   Diagnosis Date    Dilated cardiomyopathy (Multi) 05/11/2022    Dilated congestive cardiomyopathy    Personal history of other diseases of the circulatory system     History of atrial fibrillation    Personal history of other diseases of the circulatory system     History of congestive heart disease    Personal history of other diseases of the circulatory system     History of hypertension    Personal history of other endocrine, nutritional and metabolic disease     History of obesity    Personal history of other endocrine, nutritional and metabolic disease     History of hyperlipidemia    Solitary pulmonary nodule 11/06/2013    Solitary pulmonary nodule    Unspecified atrial flutter (Multi)     Atrial flutter        Past Surgical History:   Procedure Laterality Date    AV NODE ABLATION  03/04/2014    Catheter Ablation Atrial Fibrillation    BACK SURGERY  12/12/2013    Back Surgery          Social History:   reports that he has quit smoking. His smoking use included cigarettes. He has a 20 pack-year smoking history. He has never used smokeless tobacco. He reports that he does not currently use alcohol. He reports that he does not currently use drugs.     Family History:  family history is not on file.      Allergies:  Allergies   Allergen Reactions    Lisinopril Cough    Penicillins Hives and Itching       Outpatient  "Medications:  Current Outpatient Medications   Medication Instructions    apixaban (ELIQUIS) 5 mg, oral, 2 times daily    carvedilol (COREG) 50 mg, oral, 2 times daily    ezetimibe (Zetia) 10 mg tablet 1 tablet, Nightly    losartan (COZAAR) 50 mg, oral, Daily    Repatha SureClick 140 mg, subcutaneous, Every 14 days    rosuvastatin (CRESTOR) 40 mg, oral, Daily    spironolactone (ALDACTONE) 12.5 mg, oral, Daily       Physical Exam:  Vitals:    02/26/25 1347   BP: 126/70   BP Location: Left arm   Patient Position: Sitting   BP Cuff Size: Adult   Pulse: 52   Resp: 22   SpO2: 91%   Weight: 145 kg (319 lb 11.2 oz)   Height: 1.88 m (6' 2\")     Wt Readings from Last 5 Encounters:   02/26/25 145 kg (319 lb 11.2 oz)   09/04/24 148 kg (327 lb 3.2 oz)   02/28/24 147 kg (323 lb 14.4 oz)   08/03/23 149 kg (329 lb)   05/11/22 (!) 152 kg (335 lb 0.8 oz)     Body mass index is 41.05 kg/m².   General: Well-developed well-nourished in no acute distress  HEENT: Normocephalic atraumatic  Neck: Supple, JVP is normal negative hepatojugular reflux 2+ carotid pulses without bruit  Pulmonary: Normal respiratory effort, clear to auscultation  Cardiovascular: No right ventricular heave, normal S1 and S2, 2 out of 6 early peaking systolic ejection murmur no rubs or gallops  Abdomen: Soft nontender nondistended  Extremities: Warm without edema 2+ radial pulses bilaterally   Neurologic: Alert and oriented x3  Psychiatric: Normal mood and affect     Last Labs:  CMP:  Recent Labs     09/11/24  1250 02/28/24  1215 08/07/23  1240    138 141   K 4.8 4.8 5.3    103 104   CO2 29 27 30   ANIONGAP 13 13 12   BUN 20 24* 17   CREATININE 0.91 0.92 0.88   EGFR 89 88  --    GLUCOSE 109* 101* 101*     Recent Labs     09/11/24  1250 02/28/24  1215 08/07/23  1240   ALBUMIN 4.4 4.4 4.5   ALKPHOS 67 66 72   ALT 14 18 15   AST 23 23 20   BILITOT 0.7 0.5 0.7     CBC:  Recent Labs     09/11/24  1250 02/28/24  1215 08/07/23  1240   WBC 7.1 6.2 7.0   HGB " "13.7 13.7 13.7   HCT 43.1 42.8 43.8    196 223   MCV 93 94 94     COAG:   Recent Labs     01/05/22  1040   INR 1.4*     HEME/ENDO:  Recent Labs     09/11/24  1250 02/28/24  1215   TSH 2.05  --    HGBA1C  --  6.1*      CARDIAC: No results for input(s): \"LDH\", \"CKMB\", \"TROPHS\", \"BNP\" in the last 55824 hours.    No lab exists for component: \"CK\", \"CKMBP\"  Recent Labs     09/11/24  1250 02/28/24  1215 08/07/23  1240 05/11/22  0935 02/22/21  1128   CHOL 101 155 149 115 160   LDLF  --   --  91 62 104*   LDLCALC 46 94  --   --   --    HDL 36.3 38.5 33.8* 37.4* 35.9*   TRIG 95 112 123 79 103       Last Cardiology Tests:  ECG:  Electrocardiogram performed today that I reviewed shows sinus bradycardia and is otherwise unremarkable.    Echo:  Echocardiogram May 11, 2022  CONCLUSIONS:   1. The left ventricular systolic function is normal.   2. Spectral Doppler shows an impaired relaxation pattern of left ventricular diastolic filling.   3. The left atrium is moderately dilated.   4. There is moderate aortic valve cusp calcification.       Cath:      Stress Test:  Stress Results:  No results found for this or any previous visit from the past 365 days.         Cardiac Imaging:  Abdominal ultrasound September 2024  CONCLUSIONS:  Aorta/Common Iliac Arteries/IVC: No evidence of abdominal aortic aneurysm in this screening examination.      Assessment/Plan   Diagnoses and all orders for this visit:  Coronary arteriosclerosis  -     ECG 12 lead (Clinic Performed)  Atrial flutter, unspecified type (Multi)  -     CBC; Future  -     Basic metabolic panel; Future  Primary hypertension  Dilated congestive cardiomyopathy (Multi)  -     Transthoracic echo (TTE) complete; Future  -     perflutren lipid microspheres (Definity) injection 0.5-10 mL of dilution  -     sulfur hexafluoride microsphr (Lumason) injection 24.28 mg  -     perflutren protein A microsphere (Optison) injection 0.5 mL  -     Insert and maintain peripheral IV; " Standing  Hyperlipidemia, unspecified hyperlipidemia type  Morbid obesity (Multi)  -     Referral to Clinical Pharmacy; Future  Glucose intolerance  -     Hemoglobin A1C; Future  Abnormal finding of blood chemistry, unspecified  -     Hemoglobin A1C; Future    In summary, Mr. Christy is a pleasant 74 year-old white male past medical history significant for paroxysmal atrial flutter status post ablation, nonischemic likely tachycardia induced dilated cardiomyopathy with recovery of his ejection fraction, hypertension, hyperlipidemia, glucose intolerance, and probable sleep apnea and nonobstructive coronary artery disease.  He is asymptomatic from a cardiac perspective.  He is euvolemic on exam.  I encouraged him to increase his physical activity and to continue to work on losing weight.  His blood pressure and lipids are at goal.  I again recommended that he consider getting a sleep study.  I also placed a clinical pharmacy referral to assist with the possibility of getting a GLP-1 agonist.  I ordered labs as indicated below.  He should continue his current cardiovascular medications.  We will see him back in follow-up in 6 months with an echo for surveillance of his LV function.    Orders:  Orders Placed This Encounter   Procedures    CBC    Basic metabolic panel    Hemoglobin A1C    Referral to Clinical Pharmacy    ECG 12 lead (Clinic Performed)    Transthoracic echo (TTE) complete      Followup Appts:  Future Appointments   Date Time Provider Department Center   8/20/2025  1:00 PM [facility] ECHO 3 [id number] [facility] Rad   8/20/2025  2:00 PM PATEL Stock-CNP [facility]1 [facility]   2/25/2026  2:00 PM Reynold Chakraborty MD A[facility]CR1 [facility]             ____________________________________________________________  Reynold Chakraborty MD  [facility] [place]  [facility]

## 2025-02-26 NOTE — PATIENT INSTRUCTIONS
Increase your physical activity and continue to work on losing weight.  Clinical pharmacy referral to assist with a weight loss drug.  Check a BMP, A1C, and CBC.  Consider getting a sleep study.  Follow-up in 6 months with an echo.

## 2025-03-31 ENCOUNTER — APPOINTMENT (OUTPATIENT)
Dept: PHARMACY | Facility: HOSPITAL | Age: 75
End: 2025-03-31
Payer: COMMERCIAL

## 2025-03-31 DIAGNOSIS — E66.01 MORBID OBESITY (MULTI): ICD-10-CM

## 2025-03-31 DIAGNOSIS — E78.5 HYPERLIPIDEMIA, UNSPECIFIED HYPERLIPIDEMIA TYPE: Primary | ICD-10-CM

## 2025-03-31 NOTE — Clinical Note
Kanu Chakraborty,  Today I spoke with Jese about GLP initiation and cost assistance for Repatha. Patient was educated on GLP-1 medications, unfortunately Wegovy and Zepbound are non-formulary through insurance and Ozempic and Mounjaro require PA for diabetes diagnosis. Plan to submit PAP application for cost of Repatha and follow up in 1 month. Thank you!

## 2025-03-31 NOTE — PROGRESS NOTES
Pharmacist Clinic: Cardiology Management    Jese Christy is a 74 y.o. male was referred to Clinical Pharmacy Team for cholesterol and weight loss management.     Referring Provider: Reynold Chakraborty MD    THIS IS A NEW PATIENT APPOINTMENT. PATIENT WILL BE ESTABLISHING CARE WITH CLINICAL PHARMACY.    Appointment was completed by Jese who was reached at primary number.    Allergies Reviewed? Yes    Allergies   Allergen Reactions    Lisinopril Cough    Penicillins Hives and Itching       Past Medical History:   Diagnosis Date    Dilated cardiomyopathy (Multi) 05/11/2022    Dilated congestive cardiomyopathy    Personal history of other diseases of the circulatory system     History of atrial fibrillation    Personal history of other diseases of the circulatory system     History of congestive heart disease    Personal history of other diseases of the circulatory system     History of hypertension    Personal history of other endocrine, nutritional and metabolic disease     History of obesity    Personal history of other endocrine, nutritional and metabolic disease     History of hyperlipidemia    Solitary pulmonary nodule 11/06/2013    Solitary pulmonary nodule    Unspecified atrial flutter (Multi)     Atrial flutter       Current Outpatient Medications on File Prior to Visit   Medication Sig Dispense Refill    apixaban (Eliquis) 5 mg tablet Take 1 tablet (5 mg) by mouth 2 times a day. 180 tablet 3    carvedilol (Coreg) 25 mg tablet Take 2 tablets (50 mg) by mouth 2 times a day. 360 tablet 3    evolocumab (Repatha SureClick) 140 mg/mL injection Inject 1 mL (140 mg) under the skin every 14 (fourteen) days. 6 each 4    ezetimibe (Zetia) 10 mg tablet Take 1 tablet (10 mg) by mouth once daily at bedtime.      losartan (Cozaar) 50 mg tablet Take 1 tablet (50 mg) by mouth once daily. 90 tablet 3    rosuvastatin (Crestor) 40 mg tablet Take 1 tablet (40 mg) by mouth once daily. 90 tablet 3    spironolactone  "(Aldactone) 25 mg tablet Take 0.5 tablets (12.5 mg) by mouth once daily. 45 tablet 3     No current facility-administered medications on file prior to visit.         RELEVANT LAB RESULTS:  Lab Results   Component Value Date    BILITOT 0.7 09/11/2024    CALCIUM 9.6 09/11/2024    CO2 29 09/11/2024     09/11/2024    CREATININE 0.91 09/11/2024    GLUCOSE 109 (H) 09/11/2024    ALKPHOS 67 09/11/2024    K 4.8 09/11/2024    PROT 7.6 09/11/2024     09/11/2024    AST 23 09/11/2024    ALT 14 09/11/2024    BUN 20 09/11/2024    ANIONGAP 13 09/11/2024    ALBUMIN 4.4 09/11/2024    GFRMALE >90 08/07/2023     Lab Results   Component Value Date    TRIG 95 09/11/2024    CHOL 101 09/11/2024    LDLCALC 46 09/11/2024    HDL 36.3 09/11/2024     No results found for: \"BMCBC\", \"CBCDIF\"     PHARMACEUTICAL ASSESSMENT:    MEDICATION RECONCILIATION    Was a medication reconciliation completed at this visit? Yes  Home Pharmacy Reviewed? Yes, describe: [facility]; [place], [place]    Drug Interactions? No    Medication Documentation Review Audit       Reviewed by Shannon Crisitna, PharmD (Pharmacist) on 03/31/25 at 1325      Medication Order Taking? Sig Documenting Provider Last Dose Status   apixaban (Eliquis) 5 mg tablet [id number] Yes Take 1 tablet (5 mg) by mouth 2 times a day. Reynold Chakraborty MD  Active   carvedilol (Coreg) 25 mg tablet [id number] Yes Take 2 tablets (50 mg) by mouth 2 times a day. Reynold Chakraborty MD Taking Active   evolocumab (Repatha SureClick) 140 mg/mL injection [id number] Yes Inject 1 mL (140 mg) under the skin every 14 (fourteen) days. Reynold Chakraborty MD Taking Active   ezetimibe (Zetia) 10 mg tablet [id number] Yes Take 1 tablet (10 mg) by mouth once daily at bedtime. Historical Provider, MD Not Taking Active   losartan (Cozaar) 50 mg tablet [id number] Yes Take 1 tablet (50 mg) by mouth once daily. Reynold Chakraborty MD Taking Active   rosuvastatin (Crestor) 40 mg tablet [id number] Yes Take 1 tablet (40 mg) by mouth " "once daily. Reynold Chakraborty MD Taking Active   spironolactone (Aldactone) 25 mg tablet [id number] Yes Take 0.5 tablets (12.5 mg) by mouth once daily. Reynold Chakraborty MD Taking Active                    DISEASE MANAGEMENT ASSESSMENT:     HYPERCHOLESTEROLEMIA ASSESSMENT    RECENT LIPID PANEL (DATE): 09/11/2024  Lab Results   Component Value Date    TRIG 95 09/11/2024    CHOL 101 09/11/2024    LDLCALC 46 09/11/2024    HDL 36.3 09/11/2024          ASCVD SCORE: The ASCVD Risk score (Callum DK, et al., 2019) failed to calculate for the following reasons:    The valid total cholesterol range is 130 to 320 mg/dL  Coronary Heart Disease (MI, angina, coronary artery stenosis): Yes   History of ischemic stroke? No   History of carotid artery stenosis? Yes   Peripheral artery disease? No   ASCVD RISK FACTORS:    CKD? No   Diabetes? No   HTN? Yes   Persistently elevated LDL? No   Elevated triglycerides? No   Inflammatory diseases (rheumatoid arthritis, psoriasis, HIV)? No    CURRENT PHARMACOTHERAPY  - Statin? Yes, describe: Rosuvastatin 40mg daily  - Ezetimibe? Yes, describe: Zetia 10mg daily  - PCSK9-I? Yes, describe: Repatha 140mg every 14 days  - Other lipid lowering agents? No      RELEVANT PAST MEDICAL HISTORY:   HLD, coronary arteriosclerosis, CHF, HTN    ASSESSMENT    Affordability/Accessibility:  PAP screen  Adherence/Organization: reports adherence, injects Repatha on \"first and middle of month\"  Adverse Effects: none reported  Recent Hospitalizations: No  Diet: does not snack, drinks zero sugar Gatorade or water   -morning: eggs, toast and coffee   -lunch: sandwich or piece of fruit   -dinner: meat, carb, veggies   Exercise: No; mobility issues due to back  Tobacco Use: No  Alcohol Use: No  Next Lipid Panel: 3-6 months      EDUCATION/COUNSELING:  - Counseled patient on MOA, expectations, side effects, duration of therapy, contraindications, administration, and monitoring parameters  - Answered all patient questions " and concerns     WEIGHT LOSS ASSESSMENT     Wt Readings from Last 3 Encounters:   02/26/25 145 kg (319 lb 11.2 oz)   09/04/24 148 kg (327 lb 3.2 oz)   02/28/24 147 kg (323 lb 14.4 oz)     BMI Readings from Last 3 Encounters:   02/26/25 41.05 kg/m²   09/04/24 42.01 kg/m²   02/28/24 41.59 kg/m²       Recorded Home Weight(s): 319lbs  Diet: does not snack, drinks zero sugar gatorade or water  -morning: eggs, toast and coffee  -lunch: sandwich of piece of fruit  -dinner: meat, carbs, veggies   Exercise Routine: does not exercise; mobility issues due to back  Additional Contributory Factors Aflutter, CHF, HLD, HTN    CURRENT PHARMACOTHERAPY   none     Affordability/Accessibility:  PAP screen  Adherence/Organization: N/A  Adverse Reactions: N/A    COUNSELING:   - Counseled patient on MOA, expectations, side effects, duration of therapy, contraindications, administration, and monitoring parameters  - Answered all patient questions and concerns; provided [facility] phone number if issues/questions arise       DISCUSSION/NOTES:   Today was an initial visit to establish with clinical pharmacy. Patient medications and allergies were reviewed and updated.   Patient states he is tolerating his cholesterol medications well reporting adherence and no adverse effects. Patient does not exercise at home due to limited mobility with his back. Patient reports eating three times daily, he eats a well balanced meal.  Discussed initiating GLP-1. Discussed options including Wegovy, Ozempic, Mounjaro and Zepbound. Patient's insurance requires PA for Mounjaro and Ozempic requiring diabetes diagnosis. Wegovy and Zepbound are not on formulary.   Of note: patient was encouraged by cardiologist to get sleep study for suspected JOSE, patient refuses to get study at this time but understands to let [facility] know if this changes as Zepbound received recent FDA approval for JOSE.   Patient was screened for  PAP. Plan to submit PAP application once income  documentation is received. Patient adds that Eliquis is expensive too. [facility] reached out to provider to allow anticoagulation to be added to referral- awaiting response. Patient states he is doing well on anticoagulation therapy. Patient reports adherence, no adverse effects, no falls and denies s/s of bleeding.     ASSESSMENT:   Patient Assistance Program (PAP)    Application for program to be submitted for the following medications: Repatha, Eliquis (pending provider approval for clinical pharmacy to manage)    [place] [facility] Address: [place]   Prescription Insurance:   Yes   Members of Household: 2   Files Taxes: Yes       Patient will be email financial information to pharmacist directly at [email or web address].    Patient verbally reports monthly or yearly income which is less than 400% federal poverty level    Patient aware this process may take up to 6 weeks.     If approved medication must be filled through [facility] and MEDICATION WILL BE MAILED TO PATIENT.       Assessment/Plan   Problem List Items Addressed This Visit       Hyperlipidemia - Primary     LDL at goal (46). Continue Repatha 140mg subcutaneous injection every 14 days, rosuvastatin 40mg daily and ezetimibe 10mg daily.          Relevant Orders    Referral to Clinical Pharmacy     Other Visit Diagnoses       Morbid obesity (Multi)        Relevant Orders    Referral to Clinical Pharmacy              RECOMMENDATIONS/PLAN:    CONTINUE  Repatha 140mg subcutaneous injection every 14 days  Rosuvastatin 40mg daily  Ezetimibe 10mg daily    Last Appnt with Referring Provider: 02/26/2025  Next Appnt with Referring Provider: 08/20/2025  Clinical Pharmacist follow up: 1 month  VAF/Application Expiration: No; pending  Type of Encounter: Virtual    Shannon Cristina, PharmD    Verbal consent to manage patient's drug therapy was obtained from the patient . They were informed they may decline to participate or withdraw from participation  in pharmacy services at any time.    Continue all meds under the continuation of care with the referring provider and clinical pharmacy team.

## 2025-05-09 DIAGNOSIS — E78.5 HYPERLIPIDEMIA, UNSPECIFIED HYPERLIPIDEMIA TYPE: Primary | ICD-10-CM

## 2025-05-09 RX ORDER — EVOLOCUMAB 140 MG/ML
140 INJECTION, SOLUTION SUBCUTANEOUS
Qty: 6 ML | Refills: 3 | Status: SHIPPED | OUTPATIENT
Start: 2025-05-09

## 2025-05-12 ENCOUNTER — APPOINTMENT (OUTPATIENT)
Dept: PHARMACY | Facility: HOSPITAL | Age: 75
End: 2025-05-12
Payer: COMMERCIAL

## 2025-05-12 DIAGNOSIS — E78.5 HYPERLIPIDEMIA, UNSPECIFIED HYPERLIPIDEMIA TYPE: ICD-10-CM

## 2025-05-12 DIAGNOSIS — E66.01 MORBID OBESITY (MULTI): ICD-10-CM

## 2025-05-12 DIAGNOSIS — I48.92 ATRIAL FLUTTER, UNSPECIFIED TYPE (MULTI): Primary | ICD-10-CM

## 2025-05-12 NOTE — PROGRESS NOTES
Pharmacist Clinic: Cardiology Management    Jese Christy is a 75 y.o. male was referred to Clinical Pharmacy Team for cholesterol and anticoagulation management.     Referring Provider: Reynold Chakraborty MD    *Referring provider agreeable to add anticoagulation to referral*    THIS IS A FOLLOW UP PATIENT APPOINTMENT. AT LAST VISIT ON 03/31/2025 WITH PHARMACIST (Shannon Cristina).    REVIEW OF LAST APPT  Today was an initial visit to establish with clinical pharmacy. Patient medications and allergies were reviewed and updated.   Patient states he is tolerating his cholesterol medications well reporting adherence and no adverse effects. Patient does not exercise at home due to limited mobility with his back. Patient reports eating three times daily, he eats a well balanced meal.  Discussed initiating GLP-1. Discussed options including Wegovy, Ozempic, Mounjaro and Zepbound. Patient's insurance requires PA for Mounjaro and Ozempic requiring diabetes diagnosis. Wegovy and Zepbound are not on formulary.   Of note: patient was encouraged by cardiologist to get sleep study for suspected JOSE, patient refuses to get study at this time but understands to let [facility] know if this changes as Zepbound received recent FDA approval for JOSE.   Patient was screened for [facility] PAP, application submitted, waiting on determination.  Patient adds that Eliquis is expensive too. Patient states he is doing well on anticoagulation therapy. Patient reports adherence, no adverse effects, no falls and denies s/s of bleeding.     Appointment was completed by Jese who was reached at primary number.    Allergies Reviewed? No    Allergies   Allergen Reactions    Lisinopril Cough    Penicillins Hives and Itching       Past Medical History:   Diagnosis Date    Dilated cardiomyopathy (Multi) 05/11/2022    Dilated congestive cardiomyopathy    Personal history of other diseases of the circulatory system     History of atrial fibrillation    Personal  history of other diseases of the circulatory system     History of congestive heart disease    Personal history of other diseases of the circulatory system     History of hypertension    Personal history of other endocrine, nutritional and metabolic disease     History of obesity    Personal history of other endocrine, nutritional and metabolic disease     History of hyperlipidemia    Solitary pulmonary nodule 11/06/2013    Solitary pulmonary nodule    Unspecified atrial flutter (Multi)     Atrial flutter       Current Outpatient Medications on File Prior to Visit   Medication Sig Dispense Refill    apixaban (Eliquis) 5 mg tablet Take 1 tablet (5 mg) by mouth 2 times a day. 180 tablet 3    carvedilol (Coreg) 25 mg tablet Take 2 tablets (50 mg) by mouth 2 times a day. 360 tablet 3    evolocumab (Repatha SureClick) 140 mg/mL injection Inject 1 mL (140 mg) under the skin every 14 (fourteen) days. 6 mL 3    ezetimibe (Zetia) 10 mg tablet Take 1 tablet (10 mg) by mouth once daily at bedtime.      losartan (Cozaar) 50 mg tablet Take 1 tablet (50 mg) by mouth once daily. 90 tablet 3    rosuvastatin (Crestor) 40 mg tablet Take 1 tablet (40 mg) by mouth once daily. 90 tablet 3    spironolactone (Aldactone) 25 mg tablet Take 0.5 tablets (12.5 mg) by mouth once daily. 45 tablet 3    [DISCONTINUED] evolocumab (Repatha SureClick) 140 mg/mL injection Inject 1 mL (140 mg) under the skin every 14 (fourteen) days. 6 each 4     No current facility-administered medications on file prior to visit.         RELEVANT LAB RESULTS:  Lab Results   Component Value Date    BILITOT 0.7 09/11/2024    CALCIUM 9.6 09/11/2024    CO2 29 09/11/2024     09/11/2024    CREATININE 0.91 09/11/2024    GLUCOSE 109 (H) 09/11/2024    ALKPHOS 67 09/11/2024    K 4.8 09/11/2024    PROT 7.6 09/11/2024     09/11/2024    AST 23 09/11/2024    ALT 14 09/11/2024    BUN 20 09/11/2024    ANIONGAP 13 09/11/2024    ALBUMIN 4.4 09/11/2024    GFRMALE >90  "08/07/2023     Lab Results   Component Value Date    TRIG 95 09/11/2024    CHOL 101 09/11/2024    LDLCALC 46 09/11/2024    HDL 36.3 09/11/2024     No results found for: \"BMCBC\", \"CBCDIF\"     PHARMACEUTICAL ASSESSMENT:    MEDICATION RECONCILIATION    Drug Interactions? No    Medication Documentation Review Audit       Reviewed by Shannon Cristina, PharmD (Pharmacist) on 03/31/25 at 1325      Medication Order Taking? Sig Documenting Provider Last Dose Status   apixaban (Eliquis) 5 mg tablet [id number] Yes Take 1 tablet (5 mg) by mouth 2 times a day. Reynold Chakraborty MD  Active   carvedilol (Coreg) 25 mg tablet [id number] Yes Take 2 tablets (50 mg) by mouth 2 times a day. Reynold Chakraborty MD Taking Active   evolocumab (Repatha SureClick) 140 mg/mL injection [id number] Yes Inject 1 mL (140 mg) under the skin every 14 (fourteen) days. Reynold Chakraborty MD Taking Active   ezetimibe (Zetia) 10 mg tablet [id number] Yes Take 1 tablet (10 mg) by mouth once daily at bedtime. Historical Provider, MD Not Taking Active   losartan (Cozaar) 50 mg tablet [id number] Yes Take 1 tablet (50 mg) by mouth once daily. Reynold Chakraborty MD Taking Active   rosuvastatin (Crestor) 40 mg tablet [id number] Yes Take 1 tablet (40 mg) by mouth once daily. Reynold Chakraborty MD Taking Active   spironolactone (Aldactone) 25 mg tablet [id number] Yes Take 0.5 tablets (12.5 mg) by mouth once daily. Reynold Chakraborty MD Taking Active                    DISEASE MANAGEMENT ASSESSMENT:     ANTICOAGULATION ASSESSMENT    The ASCVD Risk score (Callum DK, et al., 2019) failed to calculate for the following reasons:    The valid total cholesterol range is 130 to 320 mg/dL    DIAGNOSIS: prevention of venous thromboembolism  - Patient is projected to be on anticoagulation long term  - [id number] Score: [3] (only included if diagnosis is atrial fibrillation)   Age: [<65 (0)] [65-74 (+1)] [> 75 (+2)]: 1  Sex: [Male/Female (+1)]: 0  CHF history: [No/Yes(+1)]: 1  Hypertension history: " [No/Yes(+1)]: 1  Stroke/TIA/thromboembolism history: [No/Yes(+2)]: 0  Vascular disease history (prior MI, peripheral artery disease, aortic plaque): [No/Yes(+1)]: 0  Diabetes history: [No/Yes(+1)]: 0    CURRENT PHARMACOTHERAPY:    Eliquis 5mg twice daily  74yo  145kg  Scr 0.91 (09/11/2024)    RELEVANT PAST MEDICAL HISTORY:   Aflutter, HLD, HTN, CHF    Affordability/Accessibility:  PAP-waiting on determination  Adherence/Organization: reports adherence  Adverse Reactions: none reported  Recent Hospitalizations: none   Recent Falls/Trauma: none reported  Changes in Tobacco or Alcohol Intake:   Tobacco: did not assess at today's visit  Alcohol: did not assess at today's visit     EDUCATION/COUNSELING:   - Counseled patient on MOA, expectations, duration of therapy, contraindications, administration, and monitoring parameters  - Counseled patient of side effects that are indicative of bleeding such as dark tarry stool, unexplainable bruising, or vomiting up a coffee ground like substance      HYPERCHOLESTEROLEMIA ASSESSMENT    RECENT LIPID PANEL (DATE): 09/11/2024  Lab Results   Component Value Date    TRIG 95 09/11/2024    CHOL 101 09/11/2024    LDLCALC 46 09/11/2024    HDL 36.3 09/11/2024          ASCVD SCORE: The ASCVD Risk score (Callum HAYNES, et al., 2019) failed to calculate for the following reasons:    The valid total cholesterol range is 130 to 320 mg/dL  Coronary Heart Disease (MI, angina, coronary artery stenosis): Yes   History of ischemic stroke? No   History of carotid artery stenosis? Yes   Peripheral artery disease? No   ASCVD RISK FACTORS:    CKD? No   Diabetes? No   HTN? Yes   Persistently elevated LDL? No   Elevated triglycerides? No   Inflammatory diseases (rheumatoid arthritis, psoriasis, HIV)? No    CURRENT PHARMACOTHERAPY  - Statin? Yes, describe: Rosuvastatin 40mg daily  - Ezetimibe? Yes, describe: Zetia 10mg daily  - PCSK9-I? Yes, describe: Repatha 140mg every 14 days  - Other lipid lowering  "agents? No      RELEVANT PAST MEDICAL HISTORY:   HLD, coronary arteriosclerosis, CHF, HTN    ASSESSMENT    Affordability/Accessibility:  PAP screen- waiting on determination  Adherence/Organization: reports adherence, injects Repatha on \"first and middle of month\"  Adverse Effects: none reported  Recent Hospitalizations: No  Diet: does not snack, drinks zero sugar Gatorade or water   -morning: eggs, toast and coffee   -lunch: sandwich or piece of fruit   -dinner: meat, carb, veggies   Exercise: No; mobility issues due to back  Tobacco Use: No  Alcohol Use: No  Next Lipid Panel: 3-6 months      EDUCATION/COUNSELING:  - Counseled patient on MOA, expectations, side effects, duration of therapy, contraindications, administration, and monitoring parameters  - Answered all patient questions and concerns       DISCUSSION/NOTES:   Patient states he is doing well on anticoagulation therapy. Patient reports adherence, no adverse effects, and denies s/s of bleeding.   Patient states he is tolerating his cholesterol medications well reporting adherence and no adverse effects. Patient diet and exercise remains unchanged.  PAP application submitted, waiting for determination from PAP team.    ASSESSMENT:    Assessment/Plan   Problem List Items Addressed This Visit       Atrial flutter (Multi) - Primary    Patient age, weight and renal function appropriate to continue Eliquis 5mg twice daily.         Relevant Orders    Referral to Clinical Pharmacy    Hyperlipidemia    LDL at goal (46). Continue Repatha 140mg subcutaneous injection every 14 days, rosuvastatin 40mg daily and ezetimibe 10mg daily.          Relevant Orders    Referral to Clinical Pharmacy     Other Visit Diagnoses         Morbid obesity (Multi)              RECOMMENDATIONS/PLAN:    CONTINUE  Repatha 140mg subcutaneous injection every 14 days  Rosuvastatin 40mg daily  Ezetimibe 10mg daily  Eliquis 5mg twice daily    Last Appnt with Referring Provider: " 02/26/2025  Next Appnt with Referring Provider: 08/20/2025  Clinical Pharmacist follow up: 1 month  VAF/Application Expiration: No; pending  Type of Encounter: Huyen Cristina PharmD    Verbal consent to manage patient's drug therapy was obtained from the patient . They were informed they may decline to participate or withdraw from participation in pharmacy services at any time.    Continue all meds under the continuation of care with the referring provider and clinical pharmacy team.

## 2025-05-12 NOTE — ASSESSMENT & PLAN NOTE
LDL at goal (46). Continue Repatha 140mg subcutaneous injection every 14 days, rosuvastatin 40mg daily and ezetimibe 10mg daily.

## 2025-05-12 NOTE — Clinical Note
Kanu Chakraborty,  Today I spoke with Jese about his Repatha and Eliquis. Patient reports adherence and no adverse effects to medication. Jese denies s/s of bleeding. Patient [facility] PAP application has been submitted, just waiting on a determination from the PAP team. Will follow up in 1 month. Thank you!

## 2025-05-15 ENCOUNTER — TELEPHONE (OUTPATIENT)
Dept: PHARMACY | Facility: HOSPITAL | Age: 75
End: 2025-05-15
Payer: COMMERCIAL

## 2025-05-15 NOTE — TELEPHONE ENCOUNTER
Patient Assistance Program Approval:     We are pleased to inform you that your application for assistance has been approved.     This approval is valid through 05/15/2026 as long as the following criteria continue to be satisfied:     Your medication (Repatha, Eliquis) remains covered under your current insurance plan.   Your prescriber does not discontinue therapy.   You do not seek reimbursement from any other private or government-funded programs for the  medication.    Under this program, the pharmacy will first bill your insurance plan for your indemnified specified medication. The [employer] will then offset your copay balance, so that your out-of pocket expense for your specialty medication will be $0.00.    Shannon Cristina, RaffiD

## 2025-05-16 ENCOUNTER — PHARMACY VISIT (OUTPATIENT)
Dept: PHARMACY | Facility: CLINIC | Age: 75
End: 2025-05-16
Payer: COMMERCIAL

## 2025-05-16 PROCEDURE — RXMED WILLOW AMBULATORY MEDICATION CHARGE

## 2025-06-09 ENCOUNTER — TELEPHONE (OUTPATIENT)
Dept: PRIMARY CARE | Facility: CLINIC | Age: 75
End: 2025-06-09
Payer: COMMERCIAL

## 2025-06-11 DIAGNOSIS — Z12.11 ENCOUNTER FOR SCREENING COLONOSCOPY: Primary | ICD-10-CM

## 2025-06-19 ENCOUNTER — OFFICE VISIT (OUTPATIENT)
Dept: PRIMARY CARE | Facility: CLINIC | Age: 75
End: 2025-06-19
Payer: COMMERCIAL

## 2025-06-19 VITALS
WEIGHT: 315 LBS | TEMPERATURE: 97.7 F | HEART RATE: 58 BPM | RESPIRATION RATE: 19 BRPM | DIASTOLIC BLOOD PRESSURE: 62 MMHG | SYSTOLIC BLOOD PRESSURE: 138 MMHG | OXYGEN SATURATION: 98 % | HEIGHT: 74 IN | BODY MASS INDEX: 40.43 KG/M2

## 2025-06-19 DIAGNOSIS — H60.93 OTITIS EXTERNA OF BOTH EARS, UNSPECIFIED CHRONICITY, UNSPECIFIED TYPE: ICD-10-CM

## 2025-06-19 DIAGNOSIS — I50.9 OTHER CONGESTIVE HEART FAILURE: ICD-10-CM

## 2025-06-19 DIAGNOSIS — E55.9 VITAMIN D DEFICIENCY: ICD-10-CM

## 2025-06-19 DIAGNOSIS — R94.5 ABNORMAL LIVER FUNCTION: ICD-10-CM

## 2025-06-19 DIAGNOSIS — Z12.11 COLON CANCER SCREENING: ICD-10-CM

## 2025-06-19 DIAGNOSIS — Z13.220 NEED FOR LIPID SCREENING: ICD-10-CM

## 2025-06-19 DIAGNOSIS — R73.9 HYPERGLYCEMIA: Primary | ICD-10-CM

## 2025-06-19 DIAGNOSIS — R82.90 ABNORMAL URINALYSIS: ICD-10-CM

## 2025-06-19 DIAGNOSIS — E53.8 B12 DEFICIENCY: ICD-10-CM

## 2025-06-19 DIAGNOSIS — Z13.29 THYROID DISORDER SCREEN: ICD-10-CM

## 2025-06-19 DIAGNOSIS — Z12.5 PROSTATE CANCER SCREENING: ICD-10-CM

## 2025-06-19 DIAGNOSIS — R19.5 POSITIVE COLORECTAL CANCER SCREENING USING COLOGUARD TEST: ICD-10-CM

## 2025-06-19 PROCEDURE — 3075F SYST BP GE 130 - 139MM HG: CPT | Performed by: INTERNAL MEDICINE

## 2025-06-19 PROCEDURE — 99213 OFFICE O/P EST LOW 20 MIN: CPT | Performed by: INTERNAL MEDICINE

## 2025-06-19 PROCEDURE — 3078F DIAST BP <80 MM HG: CPT | Performed by: INTERNAL MEDICINE

## 2025-06-19 PROCEDURE — 1159F MED LIST DOCD IN RCRD: CPT | Performed by: INTERNAL MEDICINE

## 2025-06-19 PROCEDURE — 3008F BODY MASS INDEX DOCD: CPT | Performed by: INTERNAL MEDICINE

## 2025-06-19 PROCEDURE — 1036F TOBACCO NON-USER: CPT | Performed by: INTERNAL MEDICINE

## 2025-06-19 RX ORDER — SULFAMETHOXAZOLE AND TRIMETHOPRIM 800; 160 MG/1; MG/1
1 TABLET ORAL 2 TIMES DAILY
Qty: 14 TABLET | Refills: 0 | Status: SHIPPED | OUTPATIENT
Start: 2025-06-19 | End: 2025-06-26

## 2025-06-19 ASSESSMENT — ANXIETY QUESTIONNAIRES
2. NOT BEING ABLE TO STOP OR CONTROL WORRYING: NOT AT ALL
5. BEING SO RESTLESS THAT IT IS HARD TO SIT STILL: NOT AT ALL
4. TROUBLE RELAXING: NOT AT ALL
IF YOU CHECKED OFF ANY PROBLEMS ON THIS QUESTIONNAIRE, HOW DIFFICULT HAVE THESE PROBLEMS MADE IT FOR YOU TO DO YOUR WORK, TAKE CARE OF THINGS AT HOME, OR GET ALONG WITH OTHER PEOPLE: NOT DIFFICULT AT ALL
6. BECOMING EASILY ANNOYED OR IRRITABLE: NOT AT ALL
GAD7 TOTAL SCORE: 0
3. WORRYING TOO MUCH ABOUT DIFFERENT THINGS: NOT AT ALL
1. FEELING NERVOUS, ANXIOUS, OR ON EDGE: NOT AT ALL
7. FEELING AFRAID AS IF SOMETHING AWFUL MIGHT HAPPEN: NOT AT ALL

## 2025-06-19 ASSESSMENT — PATIENT HEALTH QUESTIONNAIRE - PHQ9
8. MOVING OR SPEAKING SO SLOWLY THAT OTHER PEOPLE COULD HAVE NOTICED. OR THE OPPOSITE, BEING SO FIGETY OR RESTLESS THAT YOU HAVE BEEN MOVING AROUND A LOT MORE THAN USUAL: NOT AT ALL
2. FEELING DOWN, DEPRESSED OR HOPELESS: NOT AT ALL
4. FEELING TIRED OR HAVING LITTLE ENERGY: NOT AT ALL
5. POOR APPETITE OR OVEREATING: NOT AT ALL
SUM OF ALL RESPONSES TO PHQ9 QUESTIONS 1 AND 2: 0
6. FEELING BAD ABOUT YOURSELF - OR THAT YOU ARE A FAILURE OR HAVE LET YOURSELF OR YOUR FAMILY DOWN: NOT AT ALL
9. THOUGHTS THAT YOU WOULD BE BETTER OFF DEAD, OR OF HURTING YOURSELF: NOT AT ALL
3. TROUBLE FALLING OR STAYING ASLEEP OR SLEEPING TOO MUCH: NOT AT ALL
7. TROUBLE CONCENTRATING ON THINGS, SUCH AS READING THE NEWSPAPER OR WATCHING TELEVISION: NOT AT ALL
1. LITTLE INTEREST OR PLEASURE IN DOING THINGS: NOT AT ALL
SUM OF ALL RESPONSES TO PHQ QUESTIONS 1-9: 0

## 2025-06-19 NOTE — PROGRESS NOTES
Subjective   Patient ID: Jese Christy is a 75 y.o. male who presents for Establish Care and Ear Drainage (right).    HPI    Review of Systems    Previous history  Medical History[1]  Surgical History[2]  Social History[3]  Family History[4]  Allergies[5]  Current Outpatient Medications   Medication Instructions    carvedilol (COREG) 50 mg, oral, 2 times daily    Eliquis 5 mg, oral, 2 times daily    ezetimibe (Zetia) 10 mg tablet 1 tablet, Nightly    losartan (COZAAR) 50 mg, oral, Daily    Repatha SureClick 140 mg, subcutaneous, Every 14 days    rosuvastatin (CRESTOR) 40 mg, oral, Daily    spironolactone (ALDACTONE) 12.5 mg, oral, Daily       Objective       Physical Exam      Assessment/Plan   Jese Christy is a 75 y.o. male who presents for the concerns below:    Assessment & Plan  Abnormal liver function    Orders:    Alanine Aminotransferase; Future    Prostate cancer screening    Orders:    Prostate Specific Antigen; Future    Abnormal urinalysis    Orders:    Urinalysis with Reflex Culture and Microscopic; Future    B12 deficiency    Orders:    Vitamin B12; Future    Vitamin D deficiency    Orders:    Vitamin D 1,25 Dihydroxy (for eval of hypercalcemia); Future    Thyroid disorder screen    Orders:    Thyroid Stimulating Hormone; Future    Need for lipid screening    Orders:    Lipid Panel; Future    Positive colorectal cancer screening using Cologuard test    Orders:    Colonoscopy Screening; High Risk Patient; Future    Colon cancer screening    Orders:    Colonoscopy Screening; High Risk Patient; Future    Hyperglycemia    Orders:    tirzepatide, weight loss, (Zepbound) 5 mg/0.5 mL injection; Inject 5 mg under the skin every 7 days.              Discussed with:   Return in :    Portions of this note were generated using digital voice recognition software, and may contain grammatical errors       Westley Cross MD  06/19/25  9:05 AM         [1]   Past Medical History:  Diagnosis Date    Dilated  cardiomyopathy (Multi) 05/11/2022    Dilated congestive cardiomyopathy    Personal history of other diseases of the circulatory system     History of atrial fibrillation    Personal history of other diseases of the circulatory system     History of congestive heart disease    Personal history of other diseases of the circulatory system     History of hypertension    Personal history of other endocrine, nutritional and metabolic disease     History of obesity    Personal history of other endocrine, nutritional and metabolic disease     History of hyperlipidemia    Solitary pulmonary nodule 11/06/2013    Solitary pulmonary nodule    Unspecified atrial flutter (Multi)     Atrial flutter   [2]   Past Surgical History:  Procedure Laterality Date    AV NODE ABLATION  03/04/2014    Catheter Ablation Atrial Fibrillation    BACK SURGERY  12/12/2013    Back Surgery   [3]   Social History  Tobacco Use    Smoking status: Former     Current packs/day: 1.00     Average packs/day: 1 pack/day for 20.0 years (20.0 ttl pk-yrs)     Types: Cigarettes    Smokeless tobacco: Never   Substance Use Topics    Alcohol use: Not Currently    Drug use: Not Currently   [4] No family history on file.  [5]   Allergies  Allergen Reactions    Lisinopril Cough    Penicillins Hives and Itching

## 2025-06-25 DIAGNOSIS — Z12.11 SPECIAL SCREENING FOR MALIGNANT NEOPLASMS, COLON: ICD-10-CM

## 2025-06-25 RX ORDER — POLYETHYLENE GLYCOL 3350, SODIUM SULFATE ANHYDROUS, SODIUM BICARBONATE, SODIUM CHLORIDE, POTASSIUM CHLORIDE 236; 22.74; 6.74; 5.86; 2.97 G/4L; G/4L; G/4L; G/4L; G/4L
4 POWDER, FOR SOLUTION ORAL ONCE
Qty: 4000 ML | Refills: 0 | Status: SHIPPED | OUTPATIENT
Start: 2025-06-25 | End: 2025-06-25

## 2025-06-30 ENCOUNTER — HOSPITAL ENCOUNTER (EMERGENCY)
Facility: HOSPITAL | Age: 75
Discharge: HOME | End: 2025-06-30
Payer: COMMERCIAL

## 2025-06-30 ENCOUNTER — APPOINTMENT (OUTPATIENT)
Dept: RADIOLOGY | Facility: HOSPITAL | Age: 75
End: 2025-06-30
Payer: COMMERCIAL

## 2025-06-30 ENCOUNTER — TELEPHONE (OUTPATIENT)
Dept: PRIMARY CARE | Facility: CLINIC | Age: 75
End: 2025-06-30
Payer: COMMERCIAL

## 2025-06-30 VITALS
BODY MASS INDEX: 40.43 KG/M2 | TEMPERATURE: 97.9 F | RESPIRATION RATE: 18 BRPM | DIASTOLIC BLOOD PRESSURE: 68 MMHG | SYSTOLIC BLOOD PRESSURE: 140 MMHG | OXYGEN SATURATION: 98 % | WEIGHT: 315 LBS | HEIGHT: 74 IN | HEART RATE: 52 BPM

## 2025-06-30 DIAGNOSIS — G89.29 ACUTE EXACERBATION OF CHRONIC LOW BACK PAIN: Primary | ICD-10-CM

## 2025-06-30 DIAGNOSIS — M54.40 LOW BACK PAIN WITH SCIATICA, SCIATICA LATERALITY UNSPECIFIED, UNSPECIFIED BACK PAIN LATERALITY, UNSPECIFIED CHRONICITY: Primary | ICD-10-CM

## 2025-06-30 DIAGNOSIS — M54.50 ACUTE EXACERBATION OF CHRONIC LOW BACK PAIN: Primary | ICD-10-CM

## 2025-06-30 DIAGNOSIS — M54.32 SCIATICA OF LEFT SIDE: ICD-10-CM

## 2025-06-30 PROCEDURE — 99284 EMERGENCY DEPT VISIT MOD MDM: CPT | Mod: 25

## 2025-06-30 PROCEDURE — 2500000004 HC RX 250 GENERAL PHARMACY W/ HCPCS (ALT 636 FOR OP/ED)

## 2025-06-30 PROCEDURE — 96372 THER/PROPH/DIAG INJ SC/IM: CPT

## 2025-06-30 PROCEDURE — 72131 CT LUMBAR SPINE W/O DYE: CPT

## 2025-06-30 PROCEDURE — 72131 CT LUMBAR SPINE W/O DYE: CPT | Performed by: RADIOLOGY

## 2025-06-30 PROCEDURE — 2500000001 HC RX 250 WO HCPCS SELF ADMINISTERED DRUGS (ALT 637 FOR MEDICARE OP)

## 2025-06-30 RX ORDER — TRAMADOL HYDROCHLORIDE 50 MG/1
50 TABLET, FILM COATED ORAL EVERY 6 HOURS PRN
Qty: 15 TABLET | Refills: 0 | Status: SHIPPED | OUTPATIENT
Start: 2025-06-30

## 2025-06-30 RX ORDER — PREDNISONE 50 MG/1
50 TABLET ORAL DAILY
Qty: 5 TABLET | Refills: 0 | Status: SHIPPED | OUTPATIENT
Start: 2025-06-30 | End: 2025-07-05

## 2025-06-30 RX ORDER — TRAMADOL HYDROCHLORIDE 50 MG/1
50 TABLET, FILM COATED ORAL ONCE
Status: COMPLETED | OUTPATIENT
Start: 2025-06-30 | End: 2025-06-30

## 2025-06-30 RX ORDER — KETOROLAC TROMETHAMINE 30 MG/ML
30 INJECTION, SOLUTION INTRAMUSCULAR; INTRAVENOUS ONCE
Status: COMPLETED | OUTPATIENT
Start: 2025-06-30 | End: 2025-06-30

## 2025-06-30 RX ORDER — TIZANIDINE HYDROCHLORIDE 4 MG/1
4 CAPSULE, GELATIN COATED ORAL 3 TIMES DAILY PRN
Qty: 21 CAPSULE | Refills: 0 | Status: SHIPPED | OUTPATIENT
Start: 2025-06-30 | End: 2025-07-07

## 2025-06-30 RX ORDER — ORPHENADRINE CITRATE 30 MG/ML
60 INJECTION INTRAMUSCULAR; INTRAVENOUS ONCE
Status: COMPLETED | OUTPATIENT
Start: 2025-06-30 | End: 2025-06-30

## 2025-06-30 RX ADMIN — TRAMADOL HYDROCHLORIDE 50 MG: 50 TABLET, COATED ORAL at 19:57

## 2025-06-30 RX ADMIN — ORPHENADRINE CITRATE 60 MG: 60 INJECTION INTRAMUSCULAR; INTRAVENOUS at 19:57

## 2025-06-30 RX ADMIN — PREDNISONE 50 MG: 20 TABLET ORAL at 21:16

## 2025-06-30 RX ADMIN — KETOROLAC TROMETHAMINE 30 MG: 30 INJECTION, SOLUTION INTRAMUSCULAR at 19:57

## 2025-06-30 ASSESSMENT — PAIN - FUNCTIONAL ASSESSMENT: PAIN_FUNCTIONAL_ASSESSMENT: 0-10

## 2025-06-30 ASSESSMENT — PAIN DESCRIPTION - ORIENTATION: ORIENTATION: LEFT;LOWER

## 2025-06-30 ASSESSMENT — PAIN SCALES - GENERAL: PAINLEVEL_OUTOF10: 9

## 2025-06-30 ASSESSMENT — PAIN DESCRIPTION - LOCATION: LOCATION: BACK

## 2025-06-30 NOTE — ED PROVIDER NOTES
Chief Complaint   Patient presents with    Back Pain       75-year-old male arrives to the emergency department chief complaint of acute on chronic low back pain.  The patient states that 7 days ago he began having left low back pain that is radiating down into his left buttock.  The patient does have a history of recurrent back pain with sciatic involvement.  The patient endorses a surgery to release his sciatic nerve, however cannot remember which side it was on.  The patient denies any acute injury or precipitating factor that led to the back pain, however endorses a 10 out of 10 back pain.  The patient states that he has oxycodone and gabapentin at home from previous injuries and had no success with the use of these medications.  The patient denies any saddle paresthesias, the patient has not had any incontinence of urine or stool.  The patient is alert and orient x 4 and answers questions appropriately.  Patient is current with all follow-up care and medications, patient is on Eliquis for atrial fibrillation though states that the ablation that he had has been successful.  Patient denies dizziness, lightheadedness, shortness of breath, chest pain, abdominal pain, nausea vomiting or diarrhea      History provided by:  Patient   used: No         PmHx, PsHx, Allergies, Family Hx, social Hx reviewed as documented    A complete 10 point review of systems was performed and is negative except for as mentioned in the HPI.    Physical Exam:    General: Patient is AAOx3, appears well developed, well nourished, is a good historian, answers questions appropriately    HEENT: head normocephalic, atraumatic, PERRLA, EOMs intact, oropharynx without erythema or exudate, buccal mucosa intact without lesions, TMs unremarkable, nose is patent bilateral    Neck: supple, full ROM, negative for lymphadenopathy, JVD, thyromegaly, tracheal deviation, nuccal rigidity    Pulmonary: CTAB, no accessory muscle use,  able to speak full clear sentences    Cardiac: HRRR, no murmurs, rubs or gallops    GI: soft, obese, non-tender, non-distended, BS + x 4, no masses or organomegaly, no guarding or CVA tenderness noted, negative don's, mcburney's    Musculoskeletal: No midline tenderness appreciated, left low back pain worse with palpation and movement.  Otherwise patient full weight bearing, DUFFY, no joint effusions, clubbing or edema noted    Skin: intact, no lesions or rashes noted, turgor is good.    Neuro: patient follow commands, cranial nerves 2-12 grossly intact, motor strengths 5/5 upper and lower extremities, DTR's and sensation are symmetrical. No focal deficits.  Pain radiating into left buttock in relation to the low back pain    Rectal/: No urinary burning, urgency, change in frequency.  Patient has no rectal complaints        Medical Decision Making  This patient was seen in the emergency department with an attending physician available at all times throughout their ED course    Primary consideration for this patient would be a flare of his chronic low back pain with sciatic involvement given that the pain is shooting into his left buttock.  However given the patient's body habitus, pain hurting worse with movement of his legs, underlying vertebral pathology or other concerning pathology is also considered, CT of the lumbar spine will be used to further evaluate.  Patient has no history of seizures, IM Toradol, IM Norflex, tramadol will be used for patient's symptoms initially.    The CT of the lumbar spine shows multilevel degenerative disc disease that the patient is already aware of, no other acute abnormality is present.    The patient will be treated with his acute exacerbation of low back pain with sciatica with prednisone, 5-day burst given his first dose here in the emergency department, tramadol for breakthrough pain, Zanaflex as a muscle relaxer.  Given the patient is on Eliquis, long-term  "anti-inflammatory is contraindicated for the patient.    Patient is established with Dr. Gordon and will follow-up as needed    Patient is amenable to the plan of discharge as outlined above, all patient's questions pertaining to their ED course were answered in their entirety.  Strict return precautions were discussed with the patient and they verbalized understanding.  Further, it was made clear to the patient that from an emergent basis, all effort and testing was done to eliminate any imminent dangerous or potentially dangerous conditions of the patient however if their symptoms get much worse or feel life-threatening, they are to return to the emergency department or call 911 immediately.    Amount and/or Complexity of Data Reviewed  Radiology: ordered. Decision-making details documented in ED Course.       Diagnoses as of 06/30/25 2102   Acute exacerbation of chronic low back pain   Sciatica of left side       The patient has had the following imaging during this ER visit: CT LUMBAR SPINE WO IV CONTRAST     Patient History   Medical History[1]  Surgical History[2]  Family History[3]  Social History[4]    ED Triage Vitals [06/30/25 1815]   Temperature Heart Rate Respirations BP   36.6 °C (97.9 °F) 52 18 140/68      Pulse Ox Temp src Heart Rate Source Patient Position   98 % -- -- --      BP Location FiO2 (%)     -- --       Vitals:    06/30/25 1815   BP: 140/68   Pulse: 52   Resp: 18   Temp: 36.6 °C (97.9 °F)   SpO2: 98%   Weight: 145 kg (320 lb)   Height: 1.88 m (6' 2\")                 [1]   Past Medical History:  Diagnosis Date    Dilated cardiomyopathy (Multi) 05/11/2022    Dilated congestive cardiomyopathy    Personal history of other diseases of the circulatory system     History of atrial fibrillation    Personal history of other diseases of the circulatory system     History of congestive heart disease    Personal history of other diseases of the circulatory system     History of hypertension    " Personal history of other endocrine, nutritional and metabolic disease     History of obesity    Personal history of other endocrine, nutritional and metabolic disease     History of hyperlipidemia    Solitary pulmonary nodule 11/06/2013    Solitary pulmonary nodule    Unspecified atrial flutter (Multi)     Atrial flutter   [2]   Past Surgical History:  Procedure Laterality Date    AV NODE ABLATION  03/04/2014    Catheter Ablation Atrial Fibrillation    BACK SURGERY  12/12/2013    Back Surgery   [3] No family history on file.  [4]   Social History  Tobacco Use    Smoking status: Former     Current packs/day: 1.00     Average packs/day: 1 pack/day for 20.0 years (20.0 ttl pk-yrs)     Types: Cigarettes    Smokeless tobacco: Never   Substance Use Topics    Alcohol use: Not Currently    Drug use: Not Currently        Ha Young, APRN-CNP  06/30/25 1670

## 2025-06-30 NOTE — TELEPHONE ENCOUNTER
Patient called with pain in his back . Patients pain level is 9, and would like your recommendation if he needs to see a specialist and what kind?

## 2025-07-11 ENCOUNTER — APPOINTMENT (OUTPATIENT)
Dept: GASTROENTEROLOGY | Facility: EXTERNAL LOCATION | Age: 75
End: 2025-07-11
Payer: COMMERCIAL

## 2025-07-11 DIAGNOSIS — D12.5 BENIGN NEOPLASM OF SIGMOID COLON: Primary | ICD-10-CM

## 2025-07-11 DIAGNOSIS — Z12.11 COLON CANCER SCREENING: ICD-10-CM

## 2025-07-11 DIAGNOSIS — R19.5 POSITIVE COLORECTAL CANCER SCREENING USING COLOGUARD TEST: ICD-10-CM

## 2025-07-11 PROCEDURE — 88305 TISSUE EXAM BY PATHOLOGIST: CPT

## 2025-07-11 PROCEDURE — 45385 COLONOSCOPY W/LESION REMOVAL: CPT | Performed by: INTERNAL MEDICINE

## 2025-07-11 PROCEDURE — 88305 TISSUE EXAM BY PATHOLOGIST: CPT | Performed by: STUDENT IN AN ORGANIZED HEALTH CARE EDUCATION/TRAINING PROGRAM

## 2025-07-11 NOTE — PROGRESS NOTES
Colonoscopy performed today 7/11/2025 at the [facility] ([facility], [facility]).  See procedure report(s) under Media tab.

## 2025-07-15 ENCOUNTER — LAB REQUISITION (OUTPATIENT)
Dept: LAB | Facility: HOSPITAL | Age: 75
End: 2025-07-15
Payer: COMMERCIAL

## 2025-07-15 DIAGNOSIS — Z12.11 ENCOUNTER FOR SCREENING FOR MALIGNANT NEOPLASM OF COLON: ICD-10-CM

## 2025-07-21 ENCOUNTER — RESULTS FOLLOW-UP (OUTPATIENT)
Dept: GASTROENTEROLOGY | Facility: CLINIC | Age: 75
End: 2025-07-21
Payer: COMMERCIAL

## 2025-07-21 LAB
LABORATORY COMMENT REPORT: NORMAL
PATH REPORT.FINAL DX SPEC: NORMAL
PATH REPORT.GROSS SPEC: NORMAL
PATH REPORT.RELEVANT HX SPEC: NORMAL
PATH REPORT.TOTAL CANCER: NORMAL

## 2025-08-11 ENCOUNTER — APPOINTMENT (OUTPATIENT)
Dept: PHARMACY | Facility: HOSPITAL | Age: 75
End: 2025-08-11
Payer: COMMERCIAL

## 2025-08-11 DIAGNOSIS — I25.10 CORONARY ARTERIOSCLEROSIS: ICD-10-CM

## 2025-08-11 DIAGNOSIS — E78.5 HYPERLIPIDEMIA, UNSPECIFIED HYPERLIPIDEMIA TYPE: Primary | ICD-10-CM

## 2025-08-11 DIAGNOSIS — I48.92 ATRIAL FLUTTER, UNSPECIFIED TYPE (MULTI): ICD-10-CM

## 2025-08-11 RX ORDER — SEMAGLUTIDE 0.25 MG/.5ML
0.25 INJECTION, SOLUTION SUBCUTANEOUS WEEKLY
Qty: 2 ML | Refills: 0 | Status: SHIPPED | OUTPATIENT
Start: 2025-08-11

## 2025-08-19 LAB
ANION GAP SERPL CALCULATED.4IONS-SCNC: 9 MMOL/L (CALC) (ref 7–17)
BUN SERPL-MCNC: 19 MG/DL (ref 7–25)
BUN/CREAT SERPL: ABNORMAL (CALC) (ref 6–22)
CALCIUM SERPL-MCNC: 9.2 MG/DL (ref 8.6–10.3)
CHLORIDE SERPL-SCNC: 103 MMOL/L (ref 98–110)
CO2 SERPL-SCNC: 26 MMOL/L (ref 20–32)
CREAT SERPL-MCNC: 0.86 MG/DL (ref 0.7–1.28)
EGFRCR SERPLBLD CKD-EPI 2021: 91 ML/MIN/1.73M2
ERYTHROCYTE [DISTWIDTH] IN BLOOD BY AUTOMATED COUNT: 14 % (ref 11–15)
EST. AVERAGE GLUCOSE BLD GHB EST-MCNC: 146 MG/DL
EST. AVERAGE GLUCOSE BLD GHB EST-SCNC: 8.1 MMOL/L
GLUCOSE SERPL-MCNC: 113 MG/DL (ref 65–99)
HBA1C MFR BLD: 6.7 %
HCT VFR BLD AUTO: 42.5 % (ref 38.5–50)
HGB BLD-MCNC: 13.7 G/DL (ref 13.2–17.1)
MCH RBC QN AUTO: 29.9 PG (ref 27–33)
MCHC RBC AUTO-ENTMCNC: 32.2 G/DL (ref 32–36)
MCV RBC AUTO: 92.8 FL (ref 80–100)
PLATELET # BLD AUTO: 178 THOUSAND/UL (ref 140–400)
PMV BLD REES-ECKER: 10.3 FL (ref 7.5–12.5)
POTASSIUM SERPL-SCNC: 4.5 MMOL/L (ref 3.5–5.3)
RBC # BLD AUTO: 4.58 MILLION/UL (ref 4.2–5.8)
SODIUM SERPL-SCNC: 138 MMOL/L (ref 135–146)
WBC # BLD AUTO: 6.8 THOUSAND/UL (ref 3.8–10.8)

## 2025-08-20 ENCOUNTER — OFFICE VISIT (OUTPATIENT)
Dept: CARDIOLOGY | Facility: HOSPITAL | Age: 75
End: 2025-08-20
Payer: COMMERCIAL

## 2025-08-20 ENCOUNTER — HOSPITAL ENCOUNTER (OUTPATIENT)
Dept: CARDIOLOGY | Facility: HOSPITAL | Age: 75
Discharge: HOME | End: 2025-08-20
Payer: COMMERCIAL

## 2025-08-20 VITALS
BODY MASS INDEX: 40.43 KG/M2 | HEART RATE: 50 BPM | OXYGEN SATURATION: 93 % | HEIGHT: 74 IN | WEIGHT: 315 LBS | RESPIRATION RATE: 18 BRPM | SYSTOLIC BLOOD PRESSURE: 106 MMHG | DIASTOLIC BLOOD PRESSURE: 50 MMHG

## 2025-08-20 DIAGNOSIS — I25.10 CORONARY ARTERIOSCLEROSIS: ICD-10-CM

## 2025-08-20 DIAGNOSIS — I42.0 DILATED CONGESTIVE CARDIOMYOPATHY (MULTI): ICD-10-CM

## 2025-08-20 DIAGNOSIS — E66.01 MORBID OBESITY (MULTI): Primary | ICD-10-CM

## 2025-08-20 DIAGNOSIS — E78.5 HYPERLIPIDEMIA, UNSPECIFIED: ICD-10-CM

## 2025-08-20 DIAGNOSIS — I10 ESSENTIAL (PRIMARY) HYPERTENSION: ICD-10-CM

## 2025-08-20 DIAGNOSIS — I50.9 HEART FAILURE, UNSPECIFIED: ICD-10-CM

## 2025-08-20 LAB
AORTIC VALVE MEAN GRADIENT: 14 MMHG
AORTIC VALVE PEAK VELOCITY: 2.49 M/S
AV PEAK GRADIENT: 25 MMHG
AVA (PEAK VEL): 1.83 CM2
AVA (VTI): 1.38 CM2
EJECTION FRACTION APICAL 4 CHAMBER: 56.2
EJECTION FRACTION: 59 %
LEFT ATRIUM VOLUME AREA LENGTH INDEX BSA: 27 ML/M2
LEFT VENTRICLE INTERNAL DIMENSION DIASTOLE: 5 CM (ref 3.5–6)
LEFT VENTRICULAR OUTFLOW TRACT DIAMETER: 2.45 CM
RIGHT VENTRICLE FREE WALL PEAK S': 8 CM/S
RIGHT VENTRICLE PEAK SYSTOLIC PRESSURE: 13 MMHG
TRICUSPID ANNULAR PLANE SYSTOLIC EXCURSION: 1.3 CM

## 2025-08-20 PROCEDURE — 99214 OFFICE O/P EST MOD 30 MIN: CPT | Performed by: NURSE PRACTITIONER

## 2025-08-20 PROCEDURE — 1159F MED LIST DOCD IN RCRD: CPT | Performed by: NURSE PRACTITIONER

## 2025-08-20 PROCEDURE — 3074F SYST BP LT 130 MM HG: CPT | Performed by: NURSE PRACTITIONER

## 2025-08-20 PROCEDURE — G2211 COMPLEX E/M VISIT ADD ON: HCPCS | Performed by: NURSE PRACTITIONER

## 2025-08-20 PROCEDURE — 3078F DIAST BP <80 MM HG: CPT | Performed by: NURSE PRACTITIONER

## 2025-08-20 PROCEDURE — 93306 TTE W/DOPPLER COMPLETE: CPT | Performed by: INTERNAL MEDICINE

## 2025-08-20 PROCEDURE — 99212 OFFICE O/P EST SF 10 MIN: CPT | Mod: 25

## 2025-08-20 PROCEDURE — 93306 TTE W/DOPPLER COMPLETE: CPT

## 2025-08-20 PROCEDURE — 3008F BODY MASS INDEX DOCD: CPT | Performed by: NURSE PRACTITIONER

## 2025-08-20 PROCEDURE — 1160F RVW MEDS BY RX/DR IN RCRD: CPT | Performed by: NURSE PRACTITIONER

## 2025-08-20 RX ORDER — CARVEDILOL 25 MG/1
50 TABLET ORAL 2 TIMES DAILY
Qty: 360 TABLET | Refills: 3 | Status: SHIPPED | OUTPATIENT
Start: 2025-08-20 | End: 2026-08-20

## 2025-08-20 RX ORDER — SPIRONOLACTONE 25 MG/1
12.5 TABLET ORAL DAILY
Qty: 45 TABLET | Refills: 3 | Status: SHIPPED | OUTPATIENT
Start: 2025-08-20 | End: 2026-08-20

## 2025-08-20 RX ORDER — ROSUVASTATIN CALCIUM 40 MG/1
40 TABLET, COATED ORAL DAILY
Qty: 90 TABLET | Refills: 3 | Status: SHIPPED | OUTPATIENT
Start: 2025-08-20 | End: 2026-08-20

## 2025-08-20 RX ORDER — LOSARTAN POTASSIUM 50 MG/1
50 TABLET ORAL DAILY
Qty: 90 TABLET | Refills: 3 | Status: SHIPPED | OUTPATIENT
Start: 2025-08-20 | End: 2026-08-20

## 2025-08-21 ENCOUNTER — TELEPHONE (OUTPATIENT)
Dept: PHARMACY | Facility: HOSPITAL | Age: 75
End: 2025-08-21
Payer: COMMERCIAL

## 2025-08-28 ENCOUNTER — APPOINTMENT (OUTPATIENT)
Dept: GASTROENTEROLOGY | Facility: CLINIC | Age: 75
End: 2025-08-28
Payer: COMMERCIAL

## 2025-09-10 ENCOUNTER — APPOINTMENT (OUTPATIENT)
Dept: PHARMACY | Facility: HOSPITAL | Age: 75
End: 2025-09-10
Payer: COMMERCIAL